# Patient Record
Sex: MALE | Race: WHITE | NOT HISPANIC OR LATINO | Employment: UNEMPLOYED | URBAN - METROPOLITAN AREA
[De-identification: names, ages, dates, MRNs, and addresses within clinical notes are randomized per-mention and may not be internally consistent; named-entity substitution may affect disease eponyms.]

---

## 2021-09-05 PROBLEM — M51.26 LUMBAR DISC HERNIATION: Status: ACTIVE | Noted: 2021-09-05

## 2021-09-05 PROBLEM — M48.061 LUMBAR FORAMINAL STENOSIS: Status: ACTIVE | Noted: 2021-09-05

## 2021-10-20 ENCOUNTER — HISTORICAL (OUTPATIENT)
Dept: ADMINISTRATIVE | Facility: HOSPITAL | Age: 41
End: 2021-10-20

## 2021-10-20 LAB
ABS NEUT (OLG): 3.22 X10(3)/MCL (ref 2.1–9.2)
ALBUMIN SERPL-MCNC: 4.5 GM/DL (ref 3.5–5)
ALBUMIN/GLOB SERPL: 1.6 RATIO (ref 1.1–2)
ALP SERPL-CCNC: 68 UNIT/L (ref 40–150)
ALT SERPL-CCNC: 11 UNIT/L (ref 0–55)
AMPHET UR QL SCN: NEGATIVE
APPEARANCE, UA: CLEAR
AST SERPL-CCNC: 17 UNIT/L (ref 5–34)
BACTERIA #/AREA URNS AUTO: ABNORMAL /HPF
BARBITURATE SCN PRESENT UR: NEGATIVE
BASOPHILS # BLD AUTO: 0 X10(3)/MCL (ref 0–0.2)
BASOPHILS NFR BLD AUTO: 0 %
BENZODIAZ UR QL SCN: NEGATIVE
BILIRUB SERPL-MCNC: 0.4 MG/DL
BILIRUB UR QL STRIP: NEGATIVE
BILIRUBIN DIRECT+TOT PNL SERPL-MCNC: 0.2 MG/DL (ref 0–0.5)
BILIRUBIN DIRECT+TOT PNL SERPL-MCNC: 0.2 MG/DL (ref 0–0.8)
BUN SERPL-MCNC: 15.1 MG/DL (ref 8.9–20.6)
CALCIUM SERPL-MCNC: 10 MG/DL (ref 8.4–10.2)
CANNABINOIDS UR QL SCN: NEGATIVE
CHLORIDE SERPL-SCNC: 105 MMOL/L (ref 98–107)
CHOLEST SERPL-MCNC: 205 MG/DL
CHOLEST/HDLC SERPL: 5 {RATIO} (ref 0–5)
CO2 SERPL-SCNC: 30 MMOL/L (ref 22–29)
COCAINE UR QL SCN: NEGATIVE
COLOR UR: YELLOW
CREAT SERPL-MCNC: 1.23 MG/DL (ref 0.73–1.18)
EOSINOPHIL # BLD AUTO: 0.1 X10(3)/MCL (ref 0–0.9)
EOSINOPHIL NFR BLD AUTO: 2 %
ERYTHROCYTE [DISTWIDTH] IN BLOOD BY AUTOMATED COUNT: 11.9 % (ref 11.5–14.5)
EST. AVERAGE GLUCOSE BLD GHB EST-MCNC: 99.7 MG/DL
GLOBULIN SER-MCNC: 2.9 GM/DL (ref 2.4–3.5)
GLUCOSE (UA): NEGATIVE
GLUCOSE SERPL-MCNC: 92 MG/DL (ref 74–100)
HAV IGM SERPL QL IA: NONREACTIVE
HBA1C MFR BLD: 5.1 %
HBV CORE IGM SERPL QL IA: NONREACTIVE
HBV SURFACE AG SERPL QL IA: NONREACTIVE
HCT VFR BLD AUTO: 42.9 % (ref 40–51)
HCV AB SERPL QL IA: NONREACTIVE
HDLC SERPL-MCNC: 39 MG/DL (ref 35–60)
HGB BLD-MCNC: 15 GM/DL (ref 13.5–17.5)
HGB UR QL STRIP: NEGATIVE
HIV 1+2 AB+HIV1 P24 AG SERPL QL IA: NONREACTIVE
HYALINE CASTS #/AREA URNS LPF: ABNORMAL /LPF
IMM GRANULOCYTES # BLD AUTO: 0.01 10*3/UL
IMM GRANULOCYTES NFR BLD AUTO: 0 %
KETONES UR QL STRIP: NEGATIVE
LDLC SERPL CALC-MCNC: 128 MG/DL (ref 50–140)
LEUKOCYTE ESTERASE UR QL STRIP: NEGATIVE
LYMPHOCYTES # BLD AUTO: 1.6 X10(3)/MCL (ref 0.6–4.6)
LYMPHOCYTES NFR BLD AUTO: 28 %
MCH RBC QN AUTO: 31.4 PG (ref 26–34)
MCHC RBC AUTO-ENTMCNC: 35 GM/DL (ref 31–37)
MCV RBC AUTO: 89.9 FL (ref 80–100)
MONOCYTES # BLD AUTO: 0.6 X10(3)/MCL (ref 0.1–1.3)
MONOCYTES NFR BLD AUTO: 10 %
NEUTROPHILS # BLD AUTO: 3.22 X10(3)/MCL (ref 2.1–9.2)
NEUTROPHILS NFR BLD AUTO: 58 %
NITRITE UR QL STRIP: NEGATIVE
NRBC BLD AUTO-RTO: 0 % (ref 0–0.2)
OPIATES UR QL SCN: NEGATIVE
PCP UR QL: NEGATIVE
PH UR STRIP.AUTO: 7 [PH] (ref 3–11)
PH UR STRIP: 7 [PH] (ref 4.5–8)
PLATELET # BLD AUTO: 220 X10(3)/MCL (ref 130–400)
PMV BLD AUTO: 9.8 FL (ref 7.4–10.4)
POTASSIUM SERPL-SCNC: 4 MMOL/L (ref 3.5–5.1)
PROT SERPL-MCNC: 7.4 GM/DL (ref 6.4–8.3)
PROT UR QL STRIP: 20 MG/DL
RBC # BLD AUTO: 4.77 X10(6)/MCL (ref 4.5–5.9)
RBC #/AREA URNS AUTO: ABNORMAL /HPF
SODIUM SERPL-SCNC: 143 MMOL/L (ref 136–145)
SP GR UR STRIP: 1.03 (ref 1–1.03)
SQUAMOUS #/AREA URNS LPF: ABNORMAL /LPF
T PALLIDUM AB SER QL: NONREACTIVE
TRIGL SERPL-MCNC: 191 MG/DL (ref 34–140)
UROBILINOGEN UR STRIP-ACNC: 4 MG/DL
VLDLC SERPL CALC-MCNC: 38 MG/DL
WBC # SPEC AUTO: 5.5 X10(3)/MCL (ref 4.5–11)
WBC #/AREA URNS AUTO: ABNORMAL /HPF

## 2021-10-28 PROBLEM — M54.16 LUMBAR RADICULOPATHY: Status: ACTIVE | Noted: 2021-10-28

## 2022-02-02 ENCOUNTER — HISTORICAL (OUTPATIENT)
Dept: OCCUPATIONAL THERAPY | Facility: HOSPITAL | Age: 42
End: 2022-02-02

## 2022-02-04 ENCOUNTER — HISTORICAL (OUTPATIENT)
Dept: OCCUPATIONAL THERAPY | Facility: HOSPITAL | Age: 42
End: 2022-02-04

## 2022-02-08 ENCOUNTER — HISTORICAL (OUTPATIENT)
Dept: OCCUPATIONAL THERAPY | Facility: HOSPITAL | Age: 42
End: 2022-02-08

## 2022-02-11 ENCOUNTER — HISTORICAL (OUTPATIENT)
Dept: OCCUPATIONAL THERAPY | Facility: HOSPITAL | Age: 42
End: 2022-02-11

## 2022-02-22 ENCOUNTER — HISTORICAL (OUTPATIENT)
Dept: OCCUPATIONAL THERAPY | Facility: HOSPITAL | Age: 42
End: 2022-02-22

## 2022-02-28 ENCOUNTER — HISTORICAL (OUTPATIENT)
Dept: OCCUPATIONAL THERAPY | Facility: HOSPITAL | Age: 42
End: 2022-02-28

## 2022-03-03 ENCOUNTER — HISTORICAL (OUTPATIENT)
Dept: OCCUPATIONAL THERAPY | Facility: HOSPITAL | Age: 42
End: 2022-03-03

## 2022-04-11 ENCOUNTER — HISTORICAL (OUTPATIENT)
Dept: ADMINISTRATIVE | Facility: HOSPITAL | Age: 42
End: 2022-04-11
Payer: MEDICAID

## 2022-04-28 VITALS
WEIGHT: 166.69 LBS | DIASTOLIC BLOOD PRESSURE: 80 MMHG | SYSTOLIC BLOOD PRESSURE: 130 MMHG | HEIGHT: 70 IN | BODY MASS INDEX: 23.86 KG/M2 | OXYGEN SATURATION: 96 %

## 2022-06-29 ENCOUNTER — HOSPITAL ENCOUNTER (OUTPATIENT)
Dept: RADIOLOGY | Facility: HOSPITAL | Age: 42
Discharge: HOME | End: 2022-06-29
Attending: NEUROLOGICAL SURGERY
Payer: MEDICAID

## 2022-06-29 DIAGNOSIS — M54.9 DORSALGIA, UNSPECIFIED: ICD-10-CM

## 2022-06-29 PROCEDURE — 72158 MRI LUMBAR SPINE W/O & W/DYE: CPT | Mod: TC

## 2022-06-29 PROCEDURE — A9577 INJ MULTIHANCE: HCPCS | Performed by: NEUROLOGICAL SURGERY

## 2022-06-29 PROCEDURE — 25500020 PHARM REV CODE 255: Performed by: NEUROLOGICAL SURGERY

## 2022-06-29 RX ADMIN — GADOBENATE DIMEGLUMINE 15 ML: 529 INJECTION, SOLUTION INTRAVENOUS at 12:06

## 2022-08-18 DIAGNOSIS — R39.198 DIFFICULTY URINATING: Primary | ICD-10-CM

## 2022-09-13 NOTE — PROGRESS NOTES
Chief Complaint: No chief complaint on file.      HPI:  Patient is a 42-year-old male referred to Urology for difficulty urinating.  Patient urine stream weak, persistent hesitancy, and straining, interruption of stream, dribbling, frequency, denies nocturia. WESLEY:  As below.  Denies Family history of urologic pathology & personal history of stones. Bladder Scan 30 ml patient voided 30 minutes prior.         Allergies:  Review of patient's allergies indicates:   Allergen Reactions    Iodine and iodide containing products        Medications:  Current Outpatient Medications   Medication Sig Dispense Refill    cyclobenzaprine HCl (CYCLOBENZAPRINE ORAL) Take by mouth.      gabapentin (NEURONTIN) 600 MG tablet Take 600 mg by mouth 3 (three) times daily.       No current facility-administered medications for this visit.       Review of Systems:  General: No fever, chills, fatigability, or weight loss.  Skin: No rashes, itching, or changes in color or texture of skin.  Chest: Denies PATEL, cyanosis, wheezing, cough, and sputum production.  Abdomen: Appetite fine. No weight loss. Denies diarrhea, abdominal pain, hematemesis, or blood in stool.  Musculoskeletal: No joint stiffness or swelling. Denies back pain.  : As above.  All other review of systems negative.    PMH:  No past medical history on file.    PSH:  Past Surgical History:   Procedure Laterality Date    EPIDURAL STEROID INJECTION Bilateral 10/7/2021    Procedure: Injection, Steroid, Epidural Interlaminar L4-L5;  Surgeon: Isamar Vazquez MD;  Location: [facility] MAIN OR;  Service: Anesthesiology;  Laterality: Bilateral;    EPIDURAL STEROID INJECTION INTO LUMBAR SPINE N/A 10/28/2021    Procedure: Injection-steroid-epidural-lumbar;  Surgeon: Isamar Vazquez MD;  Location: [facility] MAIN OR;  Service: Anesthesiology;  Laterality: N/A;    MINIMALLY INVASIVE SURGICAL REMOVAL OF INTERVERTEBRAL DISC OF SPINE USING MICROSCOPE Right 1/5/2022    Procedure: DISCECTOMY, SPINE, MINIMALLY  INVASIVE, USING MICROSCOPE; Right L4-5; 3hrs duration; prone; C-arm; Microscope; METRx tubes; Damian flat with Chris frame;;  Surgeon: Oseas Dumont MD;  Location: [facility] MAIN OR;  Service: Neurosurgery;  Laterality: Right;       FamHx:  Family History   Problem Relation Age of Onset    Arthritis Father     Asthma Father     COPD Father     Stroke Father        SocHx:  Social History     Socioeconomic History    Marital status: Significant Other   Tobacco Use    Smoking status: Every Day     Packs/day: 0.50     Types: Cigarettes    Smokeless tobacco: Never   Substance and Sexual Activity    Alcohol use: Never    Drug use: Never    Sexual activity: Not Currently       Physical Exam:  There were no vitals filed for this visit.  General: A&Ox3, no apparent distress, no deformities  Neck: No masses, normal thyroid  Lungs: CTA keenan, no use of accessory muscles  Heart: RRR, no arrhythmias  Abdomen: Soft, NT, ND, no masses, no hernias, no hepatosplenomegaly  Lymphatic: Neck and groin nodes negative  Skin: The skin is warm and dry. No jaundice.  Ext: No c/c/e.    GUMale: Test desc keenan, no abnormalities of epididymus. Penis, with normal penile and scrotal skin. Meatus normal. Normal rectal tone, no hemorrhoids. Prostate 2 and half fingerbreadths wide, smooth, soft, nontender, no nodules or masses appreciated. SV not palpable. Perineum and anus normal.    Labs:  None    Urinalysis:  None        Imaging:  None     Impression:  Difficulty urinating      Plan:  Get PSA now, start Flomax 0.4 mg p.o. daily, F/U 2 months. Instructed patient on Timed voiding every 2-3 hours, double voiding, avoiding bladder irritants, such as alcohol, citrus foods & drinks, caffeine drinks energy drinks, spicy foods, sodas, increase water intake.  Instructed patient to avoid drinking fluids 1 to hours prior to bedtime.

## 2022-09-14 ENCOUNTER — LAB VISIT (OUTPATIENT)
Dept: LAB | Facility: HOSPITAL | Age: 42
End: 2022-09-14
Attending: NURSE PRACTITIONER
Payer: MEDICAID

## 2022-09-14 ENCOUNTER — OFFICE VISIT (OUTPATIENT)
Dept: UROLOGY | Facility: CLINIC | Age: 42
End: 2022-09-14
Payer: MEDICAID

## 2022-09-14 VITALS
OXYGEN SATURATION: 97 % | DIASTOLIC BLOOD PRESSURE: 86 MMHG | SYSTOLIC BLOOD PRESSURE: 125 MMHG | TEMPERATURE: 99 F | BODY MASS INDEX: 22.94 KG/M2 | RESPIRATION RATE: 20 BRPM | HEART RATE: 88 BPM | WEIGHT: 160.25 LBS | HEIGHT: 70 IN

## 2022-09-14 DIAGNOSIS — N40.1 BENIGN PROSTATIC HYPERPLASIA (BPH) WITH STRAINING ON URINATION: Primary | ICD-10-CM

## 2022-09-14 DIAGNOSIS — R39.16 BENIGN PROSTATIC HYPERPLASIA (BPH) WITH STRAINING ON URINATION: ICD-10-CM

## 2022-09-14 DIAGNOSIS — N40.1 BENIGN PROSTATIC HYPERPLASIA (BPH) WITH STRAINING ON URINATION: ICD-10-CM

## 2022-09-14 DIAGNOSIS — R39.16 BENIGN PROSTATIC HYPERPLASIA (BPH) WITH STRAINING ON URINATION: Primary | ICD-10-CM

## 2022-09-14 DIAGNOSIS — R39.198 DIFFICULTY URINATING: ICD-10-CM

## 2022-09-14 LAB
FREE/TOTAL PSA (OHS): 26.1 %
PSA FREE MFR SERPL: 26 %
PSA FREE SERPL-MCNC: 0.24 NG/ML
PSA SERPL-MCNC: 0.92 NG/ML

## 2022-09-14 PROCEDURE — 3079F DIAST BP 80-89 MM HG: CPT | Mod: CPTII,,, | Performed by: NURSE PRACTITIONER

## 2022-09-14 PROCEDURE — 1160F PR REVIEW ALL MEDS BY PRESCRIBER/CLIN PHARMACIST DOCUMENTED: ICD-10-PCS | Mod: CPTII,,, | Performed by: NURSE PRACTITIONER

## 2022-09-14 PROCEDURE — 3074F PR MOST RECENT SYSTOLIC BLOOD PRESSURE < 130 MM HG: ICD-10-PCS | Mod: CPTII,,, | Performed by: NURSE PRACTITIONER

## 2022-09-14 PROCEDURE — 36415 COLL VENOUS BLD VENIPUNCTURE: CPT

## 2022-09-14 PROCEDURE — 84154 ASSAY OF PSA FREE: CPT

## 2022-09-14 PROCEDURE — 1160F RVW MEDS BY RX/DR IN RCRD: CPT | Mod: CPTII,,, | Performed by: NURSE PRACTITIONER

## 2022-09-14 PROCEDURE — 3079F PR MOST RECENT DIASTOLIC BLOOD PRESSURE 80-89 MM HG: ICD-10-PCS | Mod: CPTII,,, | Performed by: NURSE PRACTITIONER

## 2022-09-14 PROCEDURE — 84153 ASSAY OF PSA TOTAL: CPT

## 2022-09-14 PROCEDURE — 99204 OFFICE O/P NEW MOD 45 MIN: CPT | Mod: S$PBB,,, | Performed by: NURSE PRACTITIONER

## 2022-09-14 PROCEDURE — 1159F MED LIST DOCD IN RCRD: CPT | Mod: CPTII,,, | Performed by: NURSE PRACTITIONER

## 2022-09-14 PROCEDURE — 99214 OFFICE O/P EST MOD 30 MIN: CPT | Mod: PBBFAC | Performed by: NURSE PRACTITIONER

## 2022-09-14 PROCEDURE — 3008F BODY MASS INDEX DOCD: CPT | Mod: CPTII,,, | Performed by: NURSE PRACTITIONER

## 2022-09-14 PROCEDURE — 3008F PR BODY MASS INDEX (BMI) DOCUMENTED: ICD-10-PCS | Mod: CPTII,,, | Performed by: NURSE PRACTITIONER

## 2022-09-14 PROCEDURE — 3074F SYST BP LT 130 MM HG: CPT | Mod: CPTII,,, | Performed by: NURSE PRACTITIONER

## 2022-09-14 PROCEDURE — 51798 US URINE CAPACITY MEASURE: CPT | Mod: PBBFAC | Performed by: NURSE PRACTITIONER

## 2022-09-14 PROCEDURE — 99204 PR OFFICE/OUTPT VISIT, NEW, LEVL IV, 45-59 MIN: ICD-10-PCS | Mod: S$PBB,,, | Performed by: NURSE PRACTITIONER

## 2022-09-14 PROCEDURE — 1159F PR MEDICATION LIST DOCUMENTED IN MEDICAL RECORD: ICD-10-PCS | Mod: CPTII,,, | Performed by: NURSE PRACTITIONER

## 2022-09-14 RX ORDER — BUPRENORPHINE HYDROCHLORIDE AND NALOXONE HYDROCHLORIDE DIHYDRATE 2; .5 MG/1; MG/1
3 TABLET SUBLINGUAL EVERY 6 HOURS PRN
COMMUNITY
End: 2023-06-06 | Stop reason: ALTCHOICE

## 2022-09-14 RX ORDER — TAMSULOSIN HYDROCHLORIDE 0.4 MG/1
0.4 CAPSULE ORAL DAILY
Qty: 30 CAPSULE | Refills: 11 | Status: SHIPPED | OUTPATIENT
Start: 2022-09-14 | End: 2022-11-16

## 2022-09-14 NOTE — PROGRESS NOTES
Placed in room. Seen by Marco Blanchard NP.  Spoke with patient. Obtain U/A now. RTC in 2  months.

## 2022-09-15 ENCOUNTER — TELEPHONE (OUTPATIENT)
Dept: UROLOGY | Facility: CLINIC | Age: 42
End: 2022-09-15
Payer: MEDICAID

## 2022-09-15 NOTE — TELEPHONE ENCOUNTER
Spoke to patient via telephone regarding PSA level 0.98 instructed patient to follow discharge instruction as discussed will re-evaluate in 2 months.

## 2022-11-16 ENCOUNTER — OFFICE VISIT (OUTPATIENT)
Dept: UROLOGY | Facility: CLINIC | Age: 42
End: 2022-11-16
Payer: MEDICAID

## 2022-11-16 VITALS
RESPIRATION RATE: 18 BRPM | TEMPERATURE: 98 F | DIASTOLIC BLOOD PRESSURE: 84 MMHG | OXYGEN SATURATION: 97 % | HEIGHT: 71 IN | BODY MASS INDEX: 21.6 KG/M2 | SYSTOLIC BLOOD PRESSURE: 137 MMHG | HEART RATE: 72 BPM | WEIGHT: 154.31 LBS

## 2022-11-16 DIAGNOSIS — R39.12 BENIGN PROSTATIC HYPERPLASIA WITH WEAK URINARY STREAM: Primary | ICD-10-CM

## 2022-11-16 DIAGNOSIS — N40.1 BENIGN PROSTATIC HYPERPLASIA WITH WEAK URINARY STREAM: Primary | ICD-10-CM

## 2022-11-16 PROCEDURE — 99214 OFFICE O/P EST MOD 30 MIN: CPT | Mod: PBBFAC | Performed by: NURSE PRACTITIONER

## 2022-11-16 PROCEDURE — 99213 PR OFFICE/OUTPT VISIT, EST, LEVL III, 20-29 MIN: ICD-10-PCS | Mod: S$PBB,,, | Performed by: NURSE PRACTITIONER

## 2022-11-16 PROCEDURE — 3079F PR MOST RECENT DIASTOLIC BLOOD PRESSURE 80-89 MM HG: ICD-10-PCS | Mod: CPTII,,, | Performed by: NURSE PRACTITIONER

## 2022-11-16 PROCEDURE — 3075F SYST BP GE 130 - 139MM HG: CPT | Mod: CPTII,,, | Performed by: NURSE PRACTITIONER

## 2022-11-16 PROCEDURE — 3008F BODY MASS INDEX DOCD: CPT | Mod: CPTII,,, | Performed by: NURSE PRACTITIONER

## 2022-11-16 PROCEDURE — 3008F PR BODY MASS INDEX (BMI) DOCUMENTED: ICD-10-PCS | Mod: CPTII,,, | Performed by: NURSE PRACTITIONER

## 2022-11-16 PROCEDURE — 1159F MED LIST DOCD IN RCRD: CPT | Mod: CPTII,,, | Performed by: NURSE PRACTITIONER

## 2022-11-16 PROCEDURE — 3079F DIAST BP 80-89 MM HG: CPT | Mod: CPTII,,, | Performed by: NURSE PRACTITIONER

## 2022-11-16 PROCEDURE — 1160F PR REVIEW ALL MEDS BY PRESCRIBER/CLIN PHARMACIST DOCUMENTED: ICD-10-PCS | Mod: CPTII,,, | Performed by: NURSE PRACTITIONER

## 2022-11-16 PROCEDURE — 1159F PR MEDICATION LIST DOCUMENTED IN MEDICAL RECORD: ICD-10-PCS | Mod: CPTII,,, | Performed by: NURSE PRACTITIONER

## 2022-11-16 PROCEDURE — 3075F PR MOST RECENT SYSTOLIC BLOOD PRESS GE 130-139MM HG: ICD-10-PCS | Mod: CPTII,,, | Performed by: NURSE PRACTITIONER

## 2022-11-16 PROCEDURE — 99213 OFFICE O/P EST LOW 20 MIN: CPT | Mod: S$PBB,,, | Performed by: NURSE PRACTITIONER

## 2022-11-16 PROCEDURE — 1160F RVW MEDS BY RX/DR IN RCRD: CPT | Mod: CPTII,,, | Performed by: NURSE PRACTITIONER

## 2022-11-16 RX ORDER — TAMSULOSIN HYDROCHLORIDE 0.4 MG/1
0.4 CAPSULE ORAL DAILY
Qty: 90 CAPSULE | Refills: 3 | Status: SHIPPED | OUTPATIENT
Start: 2022-11-16 | End: 2022-11-17

## 2022-11-16 NOTE — PROGRESS NOTES
Patient seen by Marco Blanchard NP. Sending new prescription for increased strength of flomax to patient pharmacy. RTC 3 months. Discharge instructions given verbal and written.

## 2022-11-16 NOTE — PROGRESS NOTES
Chief Complaint:   Chief Complaint   Patient presents with    Benign Prostatic Hypertrophy       HPI: Patient is a 42-year-old male here for 2 month F/U for difficulty urinating.  Patient placed Flomax 0.4 mg p.o. daily along with behavior modification. Patient urine stream from the 1st then weekend after bottom month, as did do with  hesitancy, straining, interruption of stream, dribbling, patient denies frequency, nocturia.           Allergies:  Review of patient's allergies indicates:   Allergen Reactions    Iodine and iodide containing products        Medications:  Current Outpatient Medications   Medication Sig Dispense Refill    buprenorphine-naloxone 2-0.5 mg (SUBOXONE) 2-0.5 mg Subl Place 3 tablets under the tongue every 6 (six) hours as needed.      gabapentin (NEURONTIN) 600 MG tablet Take 600 mg by mouth 3 (three) times daily.      tamsulosin (FLOMAX) 0.4 mg Cap Take 1 capsule (0.4 mg total) by mouth once daily. 30 capsule 11    cyclobenzaprine HCl (CYCLOBENZAPRINE ORAL) Take by mouth.       No current facility-administered medications for this visit.       Review of Systems:  General: No fever, chills, fatigability, or weight loss.  Skin: No rashes, itching, or changes in color or texture of skin.  Chest: Denies PATEL, cyanosis, wheezing, cough, and sputum production.  Abdomen: Appetite fine. No weight loss. Denies diarrhea, abdominal pain, hematemesis, or blood in stool.  Musculoskeletal: No joint stiffness or swelling. Denies back pain.  : As above.  All other review of systems negative.    PMH:  No past medical history on file.    PSH:  Past Surgical History:   Procedure Laterality Date    EPIDURAL STEROID INJECTION Bilateral 10/7/2021    Procedure: Injection, Steroid, Epidural Interlaminar L4-L5;  Surgeon: Isamar Vazquez MD;  Location: [facility] MAIN OR;  Service: Anesthesiology;  Laterality: Bilateral;    EPIDURAL STEROID INJECTION INTO LUMBAR SPINE N/A 10/28/2021    Procedure:  Injection-steroid-epidural-lumbar;  Surgeon: sIamar Vazquez MD;  Location: [facility] MAIN OR;  Service: Anesthesiology;  Laterality: N/A;    MINIMALLY INVASIVE SURGICAL REMOVAL OF INTERVERTEBRAL DISC OF SPINE USING MICROSCOPE Right 1/5/2022    Procedure: DISCECTOMY, SPINE, MINIMALLY INVASIVE, USING MICROSCOPE; Right L4-5; 3hrs duration; prone; C-arm; Microscope; METRx tubes; Damian flat with Chris frame;;  Surgeon: Oseas Dumont MD;  Location: [facility] MAIN OR;  Service: Neurosurgery;  Laterality: Right;       FamHx:  Family History   Problem Relation Age of Onset    Arthritis Father     Asthma Father     COPD Father     Stroke Father        SocHx:  Social History     Socioeconomic History    Marital status: Significant Other   Tobacco Use    Smoking status: Every Day     Packs/day: 0.50     Types: Cigarettes    Smokeless tobacco: Current   Substance and Sexual Activity    Alcohol use: Never    Drug use: Never    Sexual activity: Not Currently       Physical Exam:  Vitals:    11/16/22 1107   BP: 137/84   Pulse: 72   Resp: 18   Temp: 98.2 °F (36.8 °C)     General: A&Ox3, no apparent distress, no deformities  Neck: No masses, normal thyroid  Lungs: CTA keenan, no use of accessory muscles  Heart: RRR, no arrhythmias  Abdomen: Soft, NT, ND, no masses, no hernias, no hepatosplenomegaly  Lymphatic: Neck and groin nodes negative  Skin: The skin is warm and dry. No jaundice.  Ext: No c/c/e.        Impression: BPH      Plan:  Increase Flomax 0.8 mg p.o. daily F/U3 months. Instructed patient on Timed voiding every 2-3 hours, double voiding, avoiding bladder irritants, such as alcohol, citrus foods & drinks, caffeine drinks energy drinks, spicy foods, sodas, increase water intake.

## 2022-11-17 DIAGNOSIS — N40.0 BENIGN PROSTATIC HYPERPLASIA WITHOUT LOWER URINARY TRACT SYMPTOMS: Primary | ICD-10-CM

## 2022-11-17 RX ORDER — TAMSULOSIN HYDROCHLORIDE 0.4 MG/1
0.4 CAPSULE ORAL DAILY
Qty: 90 CAPSULE | Refills: 3 | Status: SHIPPED | OUTPATIENT
Start: 2022-11-17 | End: 2022-11-29

## 2022-11-29 DIAGNOSIS — R39.16 BENIGN PROSTATIC HYPERPLASIA (BPH) WITH STRAINING ON URINATION: Primary | ICD-10-CM

## 2022-11-29 DIAGNOSIS — N40.1 BENIGN PROSTATIC HYPERPLASIA (BPH) WITH STRAINING ON URINATION: Primary | ICD-10-CM

## 2022-11-29 RX ORDER — TAMSULOSIN HYDROCHLORIDE 0.4 MG/1
0.8 CAPSULE ORAL DAILY
Qty: 180 CAPSULE | Refills: 3 | Status: SHIPPED | OUTPATIENT
Start: 2022-11-29 | End: 2023-02-16

## 2023-02-16 ENCOUNTER — OFFICE VISIT (OUTPATIENT)
Dept: UROLOGY | Facility: CLINIC | Age: 43
End: 2023-02-16
Payer: MEDICAID

## 2023-02-16 VITALS
TEMPERATURE: 98 F | RESPIRATION RATE: 20 BRPM | OXYGEN SATURATION: 100 % | WEIGHT: 148 LBS | BODY MASS INDEX: 21.19 KG/M2 | DIASTOLIC BLOOD PRESSURE: 88 MMHG | HEART RATE: 97 BPM | SYSTOLIC BLOOD PRESSURE: 128 MMHG | HEIGHT: 70 IN

## 2023-02-16 DIAGNOSIS — N40.1 BENIGN PROSTATIC HYPERPLASIA (BPH) WITH STRAINING ON URINATION: Primary | ICD-10-CM

## 2023-02-16 DIAGNOSIS — R39.16 BENIGN PROSTATIC HYPERPLASIA (BPH) WITH STRAINING ON URINATION: Primary | ICD-10-CM

## 2023-02-16 PROCEDURE — 3074F SYST BP LT 130 MM HG: CPT | Mod: CPTII,,, | Performed by: NURSE PRACTITIONER

## 2023-02-16 PROCEDURE — 99213 PR OFFICE/OUTPT VISIT, EST, LEVL III, 20-29 MIN: ICD-10-PCS | Mod: S$PBB,,, | Performed by: NURSE PRACTITIONER

## 2023-02-16 PROCEDURE — 3079F DIAST BP 80-89 MM HG: CPT | Mod: CPTII,,, | Performed by: NURSE PRACTITIONER

## 2023-02-16 PROCEDURE — 1160F PR REVIEW ALL MEDS BY PRESCRIBER/CLIN PHARMACIST DOCUMENTED: ICD-10-PCS | Mod: CPTII,,, | Performed by: NURSE PRACTITIONER

## 2023-02-16 PROCEDURE — 1159F MED LIST DOCD IN RCRD: CPT | Mod: CPTII,,, | Performed by: NURSE PRACTITIONER

## 2023-02-16 PROCEDURE — 3074F PR MOST RECENT SYSTOLIC BLOOD PRESSURE < 130 MM HG: ICD-10-PCS | Mod: CPTII,,, | Performed by: NURSE PRACTITIONER

## 2023-02-16 PROCEDURE — 3079F PR MOST RECENT DIASTOLIC BLOOD PRESSURE 80-89 MM HG: ICD-10-PCS | Mod: CPTII,,, | Performed by: NURSE PRACTITIONER

## 2023-02-16 PROCEDURE — 1159F PR MEDICATION LIST DOCUMENTED IN MEDICAL RECORD: ICD-10-PCS | Mod: CPTII,,, | Performed by: NURSE PRACTITIONER

## 2023-02-16 PROCEDURE — 99213 OFFICE O/P EST LOW 20 MIN: CPT | Mod: PBBFAC | Performed by: NURSE PRACTITIONER

## 2023-02-16 PROCEDURE — 99213 OFFICE O/P EST LOW 20 MIN: CPT | Mod: S$PBB,,, | Performed by: NURSE PRACTITIONER

## 2023-02-16 PROCEDURE — 1160F RVW MEDS BY RX/DR IN RCRD: CPT | Mod: CPTII,,, | Performed by: NURSE PRACTITIONER

## 2023-02-16 PROCEDURE — 3008F PR BODY MASS INDEX (BMI) DOCUMENTED: ICD-10-PCS | Mod: CPTII,,, | Performed by: NURSE PRACTITIONER

## 2023-02-16 PROCEDURE — 3008F BODY MASS INDEX DOCD: CPT | Mod: CPTII,,, | Performed by: NURSE PRACTITIONER

## 2023-02-16 RX ORDER — TAMSULOSIN HYDROCHLORIDE 0.4 MG/1
0.8 CAPSULE ORAL DAILY
Qty: 180 CAPSULE | Refills: 3 | Status: SHIPPED | OUTPATIENT
Start: 2023-02-16 | End: 2024-02-16

## 2023-02-16 NOTE — PROGRESS NOTES
Placed in room. Seen by Marco Blanchard NP. Spoke with patient. Bladder scan at 14ml. RTC in 6 months with a PSA.

## 2023-02-16 NOTE — PROGRESS NOTES
Chief Complaint:   Chief Complaint   Patient presents with    BPH and weak stream     States symptoms have improved       HPI:  Patient is a 42-year-old male here for 2 month follow-up for BPH.  Patient recently increased Flomax 0.8 mg p.o. daily for BPH.  Patient states improved urinary retention symptoms patient voiding very well now that Flomax has been increased.  Patient urine stream, hesitancy, straining, interruption of stream, dribbling, frequency, nocturia. WESLEY:  Deferred per patient.     Allergies:  Review of patient's allergies indicates:   Allergen Reactions    Iodine and iodide containing products        Medications:  Current Outpatient Medications   Medication Sig Dispense Refill    buprenorphine-naloxone 2-0.5 mg (SUBOXONE) 2-0.5 mg Subl Place 3 tablets under the tongue every 6 (six) hours as needed.      gabapentin (NEURONTIN) 600 MG tablet Take 600 mg by mouth 3 (three) times daily.      tamsulosin (FLOMAX) 0.4 mg Cap Take 2 capsules (0.8 mg total) by mouth once daily. 180 capsule 3    cyclobenzaprine HCl (CYCLOBENZAPRINE ORAL) Take by mouth.       No current facility-administered medications for this visit.       Review of Systems:  General: No fever, chills, fatigability, or weight loss.  Skin: No rashes, itching, or changes in color or texture of skin.  Chest: Denies PATEL, cyanosis, wheezing, cough, and sputum production.  Abdomen: Appetite fine. No weight loss. Denies diarrhea, abdominal pain, hematemesis, or blood in stool.  Musculoskeletal: No joint stiffness or swelling. Denies back pain.  : As above.  All other review of systems negative.    PMH:  No past medical history on file.    PSH:  Past Surgical History:   Procedure Laterality Date    EPIDURAL STEROID INJECTION Bilateral 10/7/2021    Procedure: Injection, Steroid, Epidural Interlaminar L4-L5;  Surgeon: Isamar Vazquez MD;  Location: [facility] MAIN OR;  Service: Anesthesiology;  Laterality: Bilateral;    EPIDURAL STEROID INJECTION INTO  LUMBAR SPINE N/A 10/28/2021    Procedure: Injection-steroid-epidural-lumbar;  Surgeon: Isamar Vazquez MD;  Location: [facility] MAIN OR;  Service: Anesthesiology;  Laterality: N/A;    MINIMALLY INVASIVE SURGICAL REMOVAL OF INTERVERTEBRAL DISC OF SPINE USING MICROSCOPE Right 1/5/2022    Procedure: DISCECTOMY, SPINE, MINIMALLY INVASIVE, USING MICROSCOPE; Right L4-5; 3hrs duration; prone; C-arm; Microscope; METRx tubes; Damian flat with Chris frame;;  Surgeon: Oseas Dumont MD;  Location: [facility] MAIN OR;  Service: Neurosurgery;  Laterality: Right;       FamHx:  Family History   Problem Relation Age of Onset    Arthritis Father     Asthma Father     COPD Father     Stroke Father        SocHx:  Social History     Socioeconomic History    Marital status: Significant Other   Tobacco Use    Smoking status: Every Day     Packs/day: 0.50     Types: Cigarettes    Smokeless tobacco: Current   Substance and Sexual Activity    Alcohol use: Never    Drug use: Never    Sexual activity: Not Currently       Physical Exam:  Vitals:    02/16/23 1115   BP: 128/88   Pulse: 97   Resp: 20   Temp: 97.5 °F (36.4 °C)     General: A&Ox3, no apparent distress, no deformities  Neck: No masses, normal thyroid  Lungs: CTA keenan, no use of accessory muscles  Heart: RRR, no arrhythmias  Abdomen: Soft, NT, ND, no masses, no hernias, no hepatosplenomegaly  Lymphatic: Neck and groin nodes negative  Skin: The skin is warm and dry. No jaundice.  Ext: No c/c/e.              Impression:  BPH, urinary retention      Plan:  Continue Flomax 0.8 mg p.o. daily, F/U 6 months with PSA. Instructed patient on Timed voiding every 2-3 hours, double voiding, avoiding bladder irritants, such as alcohol, citrus foods & drinks, caffeine drinks energy drinks, spicy foods, sodas, increase water intake.  Instructed patient to avoid drinking fluids 2 hours prior to bedtime and throughout the night and P immediately prior to bedtime.

## 2023-06-06 ENCOUNTER — HOSPITAL ENCOUNTER (EMERGENCY)
Facility: HOSPITAL | Age: 43
Discharge: HOME | End: 2023-06-06
Attending: FAMILY MEDICINE
Payer: MEDICAID

## 2023-06-06 VITALS
TEMPERATURE: 98 F | RESPIRATION RATE: 20 BRPM | DIASTOLIC BLOOD PRESSURE: 92 MMHG | BODY MASS INDEX: 21.47 KG/M2 | SYSTOLIC BLOOD PRESSURE: 129 MMHG | OXYGEN SATURATION: 99 % | HEART RATE: 90 BPM | WEIGHT: 150 LBS | HEIGHT: 70 IN

## 2023-06-06 DIAGNOSIS — M25.552 LEFT HIP PAIN: ICD-10-CM

## 2023-06-06 PROCEDURE — 63600175 PHARM REV CODE 636 W HCPCS: Performed by: FAMILY MEDICINE

## 2023-06-06 PROCEDURE — 99284 EMERGENCY DEPT VISIT MOD MDM: CPT

## 2023-06-06 PROCEDURE — 96372 THER/PROPH/DIAG INJ SC/IM: CPT | Performed by: FAMILY MEDICINE

## 2023-06-06 RX ORDER — DEXAMETHASONE SODIUM PHOSPHATE 4 MG/ML
8 INJECTION, SOLUTION INTRA-ARTICULAR; INTRALESIONAL; INTRAMUSCULAR; INTRAVENOUS; SOFT TISSUE
Status: COMPLETED | OUTPATIENT
Start: 2023-06-06 | End: 2023-06-06

## 2023-06-06 RX ORDER — GABAPENTIN 800 MG/1
800 TABLET ORAL
COMMUNITY
Start: 2023-01-10

## 2023-06-06 RX ORDER — OXYCODONE AND ACETAMINOPHEN 10; 325 MG/1; MG/1
1 TABLET ORAL EVERY 6 HOURS PRN
COMMUNITY
Start: 2023-05-30

## 2023-06-06 RX ORDER — OXYCODONE HYDROCHLORIDE 15 MG/1
15 TABLET ORAL EVERY 6 HOURS PRN
COMMUNITY
Start: 2023-05-19

## 2023-06-06 RX ORDER — PREGABALIN 150 MG/1
150 CAPSULE ORAL 2 TIMES DAILY
COMMUNITY
Start: 2023-05-13

## 2023-06-06 RX ORDER — CYCLOBENZAPRINE HCL 10 MG
10 TABLET ORAL NIGHTLY
COMMUNITY
Start: 2023-05-30

## 2023-06-06 RX ORDER — KETOROLAC TROMETHAMINE 10 MG/1
10 TABLET, FILM COATED ORAL EVERY 6 HOURS
Qty: 20 TABLET | Refills: 0 | Status: SHIPPED | OUTPATIENT
Start: 2023-06-06 | End: 2023-06-11

## 2023-06-06 RX ADMIN — DEXAMETHASONE SODIUM PHOSPHATE 8 MG: 4 INJECTION, SOLUTION INTRA-ARTICULAR; INTRALESIONAL; INTRAMUSCULAR; INTRAVENOUS; SOFT TISSUE at 12:06

## 2023-06-06 NOTE — ED NOTES
Pt ambulated to room 2 with steady gait. Pt states he had back surgery may 11th and has had hip pain since then. Pt rates pain 10/10. Pt states he is on oxycodone 10mg. Pt denies needs at this time.

## 2023-06-06 NOTE — ED PROVIDER NOTES
Encounter Date: 6/6/2023       History     Chief Complaint   Patient presents with    Hip Pain     Left hip pain started years ago but has increased after back surgery on may 11th     This patient is a 42-year-old male who comes in with complaint of his left hip.  He states that he had recent lower back surgery and he feels that he really needed hip surgery.  He states that he is on oxycodone in his not helping him for the pain.    The history is provided by the patient.   Review of patient's allergies indicates:   Allergen Reactions    Iodine and iodide containing products      History reviewed. No pertinent past medical history.  Past Surgical History:   Procedure Laterality Date    EPIDURAL STEROID INJECTION Bilateral 10/7/2021    Procedure: Injection, Steroid, Epidural Interlaminar L4-L5;  Surgeon: Isamar Vazquez MD;  Location: [facility] MAIN OR;  Service: Anesthesiology;  Laterality: Bilateral;    EPIDURAL STEROID INJECTION INTO LUMBAR SPINE N/A 10/28/2021    Procedure: Injection-steroid-epidural-lumbar;  Surgeon: Isamar Vazquez MD;  Location: [facility] MAIN OR;  Service: Anesthesiology;  Laterality: N/A;    MINIMALLY INVASIVE SURGICAL REMOVAL OF INTERVERTEBRAL DISC OF SPINE USING MICROSCOPE Right 1/5/2022    Procedure: DISCECTOMY, SPINE, MINIMALLY INVASIVE, USING MICROSCOPE; Right L4-5; 3hrs duration; prone; C-arm; Microscope; METRx tubes; Damian flat with Chris frame;;  Surgeon: Oseas Dumont MD;  Location: [facility] MAIN OR;  Service: Neurosurgery;  Laterality: Right;     Family History   Problem Relation Age of Onset    Arthritis Father     Asthma Father     COPD Father     Stroke Father      Social History     Tobacco Use    Smoking status: Every Day     Packs/day: 0.50     Types: Cigarettes    Smokeless tobacco: Current   Substance Use Topics    Alcohol use: Never    Drug use: Never     Review of Systems   Constitutional: Negative.    HENT: Negative.     Respiratory: Negative.     Cardiovascular: Negative.     Endocrine: Negative.    Musculoskeletal: Negative.         Left hip pain   Skin: Negative.    Neurological: Negative.    Psychiatric/Behavioral: Negative.     All other systems reviewed and are negative.    Physical Exam     Initial Vitals [06/06/23 1145]   BP Pulse Resp Temp SpO2   (!) 130/102 92 18 97.9 °F (36.6 °C) (!) 94 %      MAP       --         Physical Exam    Nursing note and vitals reviewed.  Constitutional: He appears well-developed and well-nourished.   HENT:   Head: Normocephalic.   Eyes: Pupils are equal, round, and reactive to light.   Neck:   Normal range of motion.  Cardiovascular:  Normal rate and regular rhythm.           Pulmonary/Chest: Breath sounds normal.   Abdominal: Abdomen is soft. Bowel sounds are normal.   Musculoskeletal:         General: Normal range of motion.      Cervical back: Normal range of motion.        Legs:       Comments: Pain in left hip radiating down to the left leg     Neurological: He is alert and oriented to person, place, and time.   Skin: Skin is warm and dry.   Psychiatric: He has a normal mood and affect.       ED Course   Procedures  Labs Reviewed - No data to display       Imaging Results              X-Ray Hip 2 or 3 views Left (with Pelvis when performed) (Final result)  Result time 06/06/23 12:15:19      Final result by Luis Lagunas MD (06/06/23 12:15:19)                   Narrative:    EXAMINATION  XR HIP WITH PELVIS WHEN PERFORMED, 2 OR 3 VIEWS LEFT    CLINICAL HISTORY  Pain in left hip    TECHNIQUE  A total of 3 images submitted of the left hip/pelvis.    COMPARISON  None available at the time of initial interpretation.    FINDINGS  There incidentally noted postoperative changes of bilateral posterior fusion involving L4 through S1. No displaced fracture or dislocation is identified. The visualized pelvic ring structures and articular spaces are preserved. There is no suggestion of large joint effusion. No aggressive osseous lesion or periosteal  reaction is appreciated. The trabecular pattern is unremarkable.    The included soft tissues are without acute abnormality.    IMPRESSION  No convincing radiographic abnormality.      Electronically signed by: Luis Bebo  Date:    06/06/2023  Time:    12:15                                     Medications   dexAMETHasone injection 8 mg (8 mg Intramuscular Given 6/6/23 1209)     Medical Decision Making:   Initial Assessment:   This patient is a 42-year-old male who is currently taking oxycodone for pain in his left hip.  He really had recent back surgery but states that that did not relieve the pain in his left hip.  He is scheduled to see an orthopedist next week  Differential Diagnosis:   Left hip pain  Clinical Tests:   Radiological Study: Ordered and Reviewed  ED Management:  X-ray was done on this patient's left hip and it showed no acute pathology.  Patient was directed to have an MRI of his left hip, once he sees the orthopedist.  Since he states that he is on narcotic medicine not getting any relief I gave him a Decadron shot.  I wrote him for some Toradol for home                        Clinical Impression:   Final diagnoses:  [M25.552] Left hip pain        ED Disposition Condition    Discharge Stable          ED Prescriptions       Medication Sig Dispense Start Date End Date Auth. Provider    ketorolac (TORADOL) 10 mg tablet Take 1 tablet (10 mg total) by mouth every 6 (six) hours. for 5 days 20 tablet 6/6/2023 6/11/2023 Kaylin Najera MD          Follow-up Information       Follow up With Specialties Details Why Contact Info    Eulalio Puckett NP Family Medicine Go in 2 days  [address] [place] [address]  [phone]               Kaylin Najera MD  06/06/23 0321

## 2023-06-16 ENCOUNTER — HOSPITAL ENCOUNTER (EMERGENCY)
Facility: HOSPITAL | Age: 43
Discharge: HOME | End: 2023-06-16
Attending: FAMILY MEDICINE
Payer: MEDICAID

## 2023-06-16 VITALS
BODY MASS INDEX: 19.6 KG/M2 | SYSTOLIC BLOOD PRESSURE: 111 MMHG | TEMPERATURE: 98 F | HEIGHT: 71 IN | HEART RATE: 92 BPM | RESPIRATION RATE: 20 BRPM | DIASTOLIC BLOOD PRESSURE: 69 MMHG | OXYGEN SATURATION: 100 % | WEIGHT: 140 LBS

## 2023-06-16 DIAGNOSIS — K52.9 GASTROENTERITIS: Primary | ICD-10-CM

## 2023-06-16 LAB
ALBUMIN SERPL-MCNC: 3.9 G/DL (ref 3.5–5)
ALBUMIN/GLOB SERPL: 1.1 RATIO (ref 1.1–2)
ALP SERPL-CCNC: 140 UNIT/L (ref 40–150)
ALT SERPL-CCNC: 13 UNIT/L (ref 0–55)
AST SERPL-CCNC: 16 UNIT/L (ref 5–34)
BASOPHILS # BLD AUTO: 0.03 X10(3)/MCL
BASOPHILS NFR BLD AUTO: 0.4 %
BILIRUBIN DIRECT+TOT PNL SERPL-MCNC: 0.4 MG/DL
BUN SERPL-MCNC: 13 MG/DL (ref 8.9–20.6)
CALCIUM SERPL-MCNC: 10.1 MG/DL (ref 8.4–10.2)
CHLORIDE SERPL-SCNC: 107 MMOL/L (ref 98–107)
CO2 SERPL-SCNC: 25 MMOL/L (ref 22–29)
CREAT SERPL-MCNC: 0.9 MG/DL (ref 0.73–1.18)
EOSINOPHIL # BLD AUTO: 0.04 X10(3)/MCL (ref 0–0.9)
EOSINOPHIL NFR BLD AUTO: 0.5 %
ERYTHROCYTE [DISTWIDTH] IN BLOOD BY AUTOMATED COUNT: 11.9 % (ref 11.5–17)
GFR SERPLBLD CREATININE-BSD FMLA CKD-EPI: >60 MLS/MIN/1.73/M2
GLOBULIN SER-MCNC: 3.7 GM/DL (ref 2.4–3.5)
GLUCOSE SERPL-MCNC: 106 MG/DL (ref 74–100)
HCT VFR BLD AUTO: 38.7 % (ref 42–52)
HGB BLD-MCNC: 13.1 G/DL (ref 14–18)
IMM GRANULOCYTES # BLD AUTO: 0.03 X10(3)/MCL (ref 0–0.04)
IMM GRANULOCYTES NFR BLD AUTO: 0.4 %
LYMPHOCYTES # BLD AUTO: 1.53 X10(3)/MCL (ref 0.6–4.6)
LYMPHOCYTES NFR BLD AUTO: 18.1 %
MCH RBC QN AUTO: 30 PG (ref 27–31)
MCHC RBC AUTO-ENTMCNC: 33.9 G/DL (ref 33–36)
MCV RBC AUTO: 88.8 FL (ref 80–94)
MONOCYTES # BLD AUTO: 0.7 X10(3)/MCL (ref 0.1–1.3)
MONOCYTES NFR BLD AUTO: 8.3 %
NEUTROPHILS # BLD AUTO: 6.1 X10(3)/MCL (ref 2.1–9.2)
NEUTROPHILS NFR BLD AUTO: 72.3 %
NRBC BLD AUTO-RTO: 0 %
PLATELET # BLD AUTO: 297 X10(3)/MCL (ref 130–400)
PMV BLD AUTO: 9.6 FL (ref 7.4–10.4)
POTASSIUM SERPL-SCNC: 4.1 MMOL/L (ref 3.5–5.1)
PROT SERPL-MCNC: 7.6 GM/DL (ref 6.4–8.3)
RBC # BLD AUTO: 4.36 X10(6)/MCL (ref 4.7–6.1)
SODIUM SERPL-SCNC: 142 MMOL/L (ref 136–145)
WBC # SPEC AUTO: 8.43 X10(3)/MCL (ref 4.5–11.5)

## 2023-06-16 PROCEDURE — 96372 THER/PROPH/DIAG INJ SC/IM: CPT | Performed by: FAMILY MEDICINE

## 2023-06-16 PROCEDURE — 96360 HYDRATION IV INFUSION INIT: CPT

## 2023-06-16 PROCEDURE — 63600175 PHARM REV CODE 636 W HCPCS: Performed by: FAMILY MEDICINE

## 2023-06-16 PROCEDURE — 80053 COMPREHEN METABOLIC PANEL: CPT | Performed by: FAMILY MEDICINE

## 2023-06-16 PROCEDURE — 99284 EMERGENCY DEPT VISIT MOD MDM: CPT | Mod: 25

## 2023-06-16 PROCEDURE — 85025 COMPLETE CBC W/AUTO DIFF WBC: CPT | Performed by: FAMILY MEDICINE

## 2023-06-16 PROCEDURE — 25000003 PHARM REV CODE 250: Performed by: FAMILY MEDICINE

## 2023-06-16 RX ORDER — DICYCLOMINE HYDROCHLORIDE 10 MG/ML
20 INJECTION INTRAMUSCULAR
Status: COMPLETED | OUTPATIENT
Start: 2023-06-16 | End: 2023-06-16

## 2023-06-16 RX ORDER — DICYCLOMINE HYDROCHLORIDE 10 MG/1
10 CAPSULE ORAL 2 TIMES DAILY PRN
Qty: 12 CAPSULE | Refills: 0 | Status: SHIPPED | OUTPATIENT
Start: 2023-06-16

## 2023-06-16 RX ADMIN — DICYCLOMINE HYDROCHLORIDE 20 MG: 20 INJECTION, SOLUTION INTRAMUSCULAR at 09:06

## 2023-06-16 RX ADMIN — SODIUM CHLORIDE 1000 ML: 9 INJECTION, SOLUTION INTRAVENOUS at 09:06

## 2023-06-16 NOTE — ED NOTES
Reported by patient that he has been having leg cramps at night and for last couple of days been having diarrhea with some nausea. Has vomited yesterday morning and this morning but currently not nauseated.  has history of back surgery and has nerve damage that they believe to be related to degenerative disk that causes legs to hurt and spasm at times.  has not been able to hold much down the last couple of days and believes he might be dehydrated.

## 2023-06-16 NOTE — ED PROVIDER NOTES
Encounter Date: 6/16/2023       History     Chief Complaint   Patient presents with    Leg Cramps     Pt to Ed with c/o Leg cramps, diarrhea x few days (PT FEELS DEHYDRATED)      Patient is a 42-year-old male who comes in with diarrhea for the past few days and now complaining of leg cramps.  States that he feels dehydrated and that his leg cramps get much worse at night.    The history is provided by the patient.   Review of patient's allergies indicates:   Allergen Reactions    Iodine and iodide containing products      History reviewed. No pertinent past medical history.  Past Surgical History:   Procedure Laterality Date    EPIDURAL STEROID INJECTION Bilateral 10/7/2021    Procedure: Injection, Steroid, Epidural Interlaminar L4-L5;  Surgeon: Isamar Vazquez MD;  Location: [facility] MAIN OR;  Service: Anesthesiology;  Laterality: Bilateral;    EPIDURAL STEROID INJECTION INTO LUMBAR SPINE N/A 10/28/2021    Procedure: Injection-steroid-epidural-lumbar;  Surgeon: Isamar Vazquez MD;  Location: [facility] MAIN OR;  Service: Anesthesiology;  Laterality: N/A;    MINIMALLY INVASIVE SURGICAL REMOVAL OF INTERVERTEBRAL DISC OF SPINE USING MICROSCOPE Right 1/5/2022    Procedure: DISCECTOMY, SPINE, MINIMALLY INVASIVE, USING MICROSCOPE; Right L4-5; 3hrs duration; prone; C-arm; Microscope; METRx tubes; Damian flat with Chris frame;;  Surgeon: Oseas Dumont MD;  Location: [facility] MAIN OR;  Service: Neurosurgery;  Laterality: Right;     Family History   Problem Relation Age of Onset    Arthritis Father     Asthma Father     COPD Father     Stroke Father      Social History     Tobacco Use    Smoking status: Every Day     Packs/day: 0.50     Types: Cigarettes    Smokeless tobacco: Current   Substance Use Topics    Alcohol use: Never    Drug use: Yes     Types: Marijuana     Review of Systems   Constitutional: Negative.    HENT: Negative.     Respiratory: Negative.     Cardiovascular: Negative.    Gastrointestinal:  Positive for diarrhea  and nausea.   Endocrine: Negative.    Genitourinary: Negative.    Musculoskeletal: Negative.    Skin: Negative.    Neurological: Negative.    Psychiatric/Behavioral: Negative.     All other systems reviewed and are negative.    Physical Exam     Initial Vitals [06/16/23 0927]   BP Pulse Resp Temp SpO2   (!) 140/85 84 20 98 °F (36.7 °C) 96 %      MAP       --         Physical Exam    Nursing note and vitals reviewed.  Constitutional: He appears well-developed and well-nourished.   HENT:   Head: Normocephalic.   Eyes: Pupils are equal, round, and reactive to light.   Neck:   Normal range of motion.  Cardiovascular:  Normal rate and regular rhythm.           Pulmonary/Chest: Breath sounds normal.   Abdominal: Abdomen is soft and flat. Bowel sounds are decreased.   Musculoskeletal:         General: Normal range of motion.      Cervical back: Normal range of motion.     Neurological: He is alert and oriented to person, place, and time.   Skin: Skin is warm and dry. Capillary refill takes less than 2 seconds.   Psychiatric: He has a normal mood and affect.       ED Course   Procedures  Labs Reviewed   COMPREHENSIVE METABOLIC PANEL - Abnormal; Notable for the following components:       Result Value    Glucose Level 106 (*)     Globulin 3.7 (*)     All other components within normal limits   CBC WITH DIFFERENTIAL - Abnormal; Notable for the following components:    RBC 4.36 (*)     Hgb 13.1 (*)     Hct 38.7 (*)     All other components within normal limits   CBC W/ AUTO DIFFERENTIAL    Narrative:     The following orders were created for panel order CBC auto differential.  Procedure                               Abnormality         Status                     ---------                               -----------         ------                     CBC with Differential[[id number]]        Abnormal            Final result                 Please view results for these tests on the individual orders.          Imaging Results     None          Medications   sodium chloride 0.9% bolus 1,000 mL 1,000 mL ( Intravenous Verify Only 6/16/23 1013)   dicyclomine injection 20 mg (20 mg Intramuscular Given 6/16/23 6464)     Medical Decision Making:   Initial Assessment:   It was this patient is a 42-year-old male who comes in with diarrhea and dehydration.  Patient states that he has been having muscle cramps at night mostly and concerned he is dehydrated.  Differential Diagnosis:   Viral gastroenteritis, diarrhea, dehydrated  Clinical Tests:   Lab Tests: Ordered and Reviewed  ED Management:  Lab work was done on this patient is found to have a glucose of 106 but the rest of his CMP is normal his WBC count is 8.43 his hemoglobin is 13.1 with a hematocrit of 38.7.  Patient is given a L of fluids in the ER he feels much better.  I also gave him 20 mg of Bentyl since he had an episode of diarrhea prior to entering the emergency room.                        Clinical Impression:   Final diagnoses:  [K52.9] Gastroenteritis (Primary)        ED Disposition Condition    Discharge Stable          ED Prescriptions       Medication Sig Dispense Start Date End Date Auth. Provider    dicyclomine (BENTYL) 10 MG capsule Take 1 capsule (10 mg total) by mouth 2 (two) times daily as needed. 12 capsule 6/16/2023 -- Kaylin Najera MD          Follow-up Information       Follow up With Specialties Details Why Contact Nohemi Puckett NP Family Medicine Schedule an appointment as soon as possible for a visit in 2 days  [address] [place] [address] [address]ON [id number]  [phone]               Kaylin Najera MD  06/16/23 7419

## 2023-06-17 ENCOUNTER — HOSPITAL ENCOUNTER (EMERGENCY)
Facility: HOSPITAL | Age: 43
Discharge: HOME | End: 2023-06-17
Attending: STUDENT IN AN ORGANIZED HEALTH CARE EDUCATION/TRAINING PROGRAM
Payer: MEDICAID

## 2023-06-17 VITALS
SYSTOLIC BLOOD PRESSURE: 134 MMHG | HEIGHT: 70 IN | DIASTOLIC BLOOD PRESSURE: 92 MMHG | BODY MASS INDEX: 20.04 KG/M2 | RESPIRATION RATE: 18 BRPM | WEIGHT: 140 LBS | TEMPERATURE: 98 F | OXYGEN SATURATION: 100 % | HEART RATE: 96 BPM

## 2023-06-17 DIAGNOSIS — F11.93 OPIATE WITHDRAWAL: Primary | ICD-10-CM

## 2023-06-17 PROCEDURE — 99284 EMERGENCY DEPT VISIT MOD MDM: CPT

## 2023-06-17 PROCEDURE — 63600175 PHARM REV CODE 636 W HCPCS: Performed by: STUDENT IN AN ORGANIZED HEALTH CARE EDUCATION/TRAINING PROGRAM

## 2023-06-17 PROCEDURE — 96372 THER/PROPH/DIAG INJ SC/IM: CPT | Mod: 59 | Performed by: STUDENT IN AN ORGANIZED HEALTH CARE EDUCATION/TRAINING PROGRAM

## 2023-06-17 PROCEDURE — 96360 HYDRATION IV INFUSION INIT: CPT

## 2023-06-17 PROCEDURE — 25000003 PHARM REV CODE 250: Performed by: STUDENT IN AN ORGANIZED HEALTH CARE EDUCATION/TRAINING PROGRAM

## 2023-06-17 RX ORDER — DICYCLOMINE HYDROCHLORIDE 10 MG/ML
20 INJECTION INTRAMUSCULAR
Status: COMPLETED | OUTPATIENT
Start: 2023-06-17 | End: 2023-06-17

## 2023-06-17 RX ORDER — SODIUM CHLORIDE 9 MG/ML
1000 INJECTION, SOLUTION INTRAVENOUS
Status: COMPLETED | OUTPATIENT
Start: 2023-06-17 | End: 2023-06-17

## 2023-06-17 RX ADMIN — DICYCLOMINE HYDROCHLORIDE 20 MG: 20 INJECTION, SOLUTION INTRAMUSCULAR at 01:06

## 2023-06-17 RX ADMIN — SODIUM CHLORIDE 1000 ML: 9 INJECTION, SOLUTION INTRAVENOUS at 01:06

## 2023-06-17 NOTE — ED PROVIDER NOTES
Encounter Date: 6/17/2023       History     Chief Complaint   Patient presents with    Leg Pain     C/o restless legs and cramping in bilateral calves intermittently x 3 days. Pt states he was on oxycontin dt back sx last month and stopped meds 3 days ago, symptoms started after.      Patient is a 42-year-old white gentleman no significant past medical history presented to the ER today due to leg tremors, muscle cramps for the last 2 days.  Patient was seen here less than 1 day ago and had lab work which was reassuring.  Patient states that he responded well to fluids and Bentyl.  Important to note patient is a chronic opiate user and states that he stopped taking opiates 3 days ago.  Patient states he has tried to come off of opiates in the past and states that these of the same symptoms that he develops proximally 3 days after cessation.  Patient states he often results to using opiates again in an effort to stop the symptoms.  Patient states the symptoms are classic for his withdrawal from opiates.  Patient denies any other complaints including fever, chills, cough, congestion chest pain, shortness of breath abdominal pain.  Patient denies any hematuria.    Review of patient's allergies indicates:   Allergen Reactions    Iodine and iodide containing products      History reviewed. No pertinent past medical history.  Past Surgical History:   Procedure Laterality Date    EPIDURAL STEROID INJECTION Bilateral 10/7/2021    Procedure: Injection, Steroid, Epidural Interlaminar L4-L5;  Surgeon: Isamar Vazquez MD;  Location: [facility] MAIN OR;  Service: Anesthesiology;  Laterality: Bilateral;    EPIDURAL STEROID INJECTION INTO LUMBAR SPINE N/A 10/28/2021    Procedure: Injection-steroid-epidural-lumbar;  Surgeon: Isamar Vazquez MD;  Location: [facility] MAIN OR;  Service: Anesthesiology;  Laterality: N/A;    MINIMALLY INVASIVE SURGICAL REMOVAL OF INTERVERTEBRAL DISC OF SPINE USING MICROSCOPE Right 1/5/2022    Procedure: DISCECTOMY,  SPINE, MINIMALLY INVASIVE, USING MICROSCOPE; Right L4-5; 3hrs duration; prone; C-arm; Microscope; METRx tubes; Damian flat with Chris frame;;  Surgeon: Oseas Dumont MD;  Location: [facility] MAIN OR;  Service: Neurosurgery;  Laterality: Right;     Family History   Problem Relation Age of Onset    Arthritis Father     Asthma Father     COPD Father     Stroke Father      Social History     Tobacco Use    Smoking status: Every Day     Packs/day: 0.50     Types: Cigarettes    Smokeless tobacco: Current   Substance Use Topics    Alcohol use: Never    Drug use: Yes     Types: Marijuana     Review of Systems   Constitutional:  Negative for chills, fatigue and fever.   HENT:  Negative for congestion, sore throat and trouble swallowing.    Eyes:  Negative for pain and visual disturbance.   Respiratory:  Negative for cough, shortness of breath and wheezing.    Cardiovascular:  Negative for chest pain and palpitations.   Gastrointestinal:  Positive for diarrhea. Negative for abdominal pain, blood in stool, constipation, nausea and vomiting.   Genitourinary:  Negative for dysuria and hematuria.   Musculoskeletal:  Positive for myalgias. Negative for back pain.   Skin:  Negative for rash and wound.   Neurological:  Positive for tremors. Negative for dizziness, seizures, syncope, facial asymmetry, speech difficulty, weakness, light-headedness, numbness and headaches.   Psychiatric/Behavioral:  Negative for confusion. The patient is not nervous/anxious.      Physical Exam     Initial Vitals [06/17/23 0122]   BP Pulse Resp Temp SpO2   (!) 136/95 95 18 98.2 °F (36.8 °C) 99 %      MAP       --         Physical Exam    Nursing note and vitals reviewed.  Constitutional: He appears well-developed and well-nourished. No distress.   HENT:   Head: Normocephalic and atraumatic.   Eyes: Conjunctivae and EOM are normal. Right eye exhibits no discharge. Left eye exhibits no discharge. No scleral icterus.   Neck: No tracheal deviation present.  "  Normal range of motion.  Cardiovascular:  Normal rate, regular rhythm and normal heart sounds.     Exam reveals no gallop and no friction rub.       No murmur heard.  Pulmonary/Chest: Breath sounds normal. No respiratory distress. He has no wheezes. He has no rhonchi. He has no rales.   Musculoskeletal:         General: No edema. Normal range of motion.      Cervical back: Normal range of motion.     Neurological: He is alert. He has normal strength.   Skin: Skin is warm and dry. No rash and no abscess noted. No erythema. No pallor.   Psychiatric: His behavior is normal. Judgment normal.       ED Course   Procedures  Labs Reviewed - No data to display       Imaging Results    None          Medications   0.9%  NaCl infusion (1,000 mLs Intravenous New Bag 6/17/23 0155)   dicyclomine injection 20 mg (20 mg Intramuscular Given 6/17/23 0149)     Medical Decision Making:   Initial Assessment:   Overall no acute distress 42-year-old male  Differential Diagnosis:   Opiate withdrawal, rhabdomyolysis, dehydration  ED Management:  Vital signs stable patient is afebrile   Chronically user and states that these is the same symptoms that he develops when he stops using opiates  Patient recently stopped using opiates 3 days ago   Patient would like to "push through withdrawal. "  Fluids and Bentyl seemed to improve this patient earlier today and thus will repeated this time   Labs were evaluated and reviewed from earlier today and noted to be largely unremarkable   Fluids and bentyl did not provide the level of relief that he received from earlier  Pt states he feels he can "push through" these withdrawal symptoms  Advised if we are correct and this is opiate withdrawal that it is unlikely to be life threatening and encouraged him to refrain from obtainin opites  He agreed and seems to want to make a change.   All questions were answered in layman terms  Return precautions discussed.                             Clinical " Impression:   Final diagnoses:  [F11.93] Opiate withdrawal (Primary)        ED Disposition Condition    Discharge Stable          ED Prescriptions    None       Follow-up Information       Follow up With Specialties Details Why Contact Info    [facility] Emergency [facility]  If symptoms worsen [address] [facility] [address]  [phone]    Eulalio Puckett NP Family Medicine Schedule an appointment as soon as possible for a visit   [address] [place] [address] [address] [address]  [phone]               Wander Sandhu MD  06/17/23 0249

## 2023-08-09 ENCOUNTER — LAB VISIT (OUTPATIENT)
Dept: LAB | Facility: HOSPITAL | Age: 43
End: 2023-08-09
Attending: NURSE PRACTITIONER
Payer: MEDICARE

## 2023-08-09 DIAGNOSIS — N40.1 BENIGN PROSTATIC HYPERPLASIA (BPH) WITH STRAINING ON URINATION: ICD-10-CM

## 2023-08-09 DIAGNOSIS — R39.16 BENIGN PROSTATIC HYPERPLASIA (BPH) WITH STRAINING ON URINATION: ICD-10-CM

## 2023-08-09 LAB — PSA SERPL-MCNC: 1.1 NG/ML

## 2023-08-09 PROCEDURE — 36415 COLL VENOUS BLD VENIPUNCTURE: CPT

## 2023-08-09 PROCEDURE — 84153 ASSAY OF PSA TOTAL: CPT

## 2023-08-16 ENCOUNTER — OFFICE VISIT (OUTPATIENT)
Dept: UROLOGY | Facility: CLINIC | Age: 43
End: 2023-08-16
Payer: MEDICARE

## 2023-08-16 VITALS
HEIGHT: 70 IN | RESPIRATION RATE: 20 BRPM | DIASTOLIC BLOOD PRESSURE: 86 MMHG | TEMPERATURE: 98 F | WEIGHT: 138 LBS | BODY MASS INDEX: 19.76 KG/M2 | OXYGEN SATURATION: 100 % | SYSTOLIC BLOOD PRESSURE: 125 MMHG | HEART RATE: 69 BPM

## 2023-08-16 DIAGNOSIS — R39.16 BENIGN PROSTATIC HYPERPLASIA (BPH) WITH STRAINING ON URINATION: Primary | ICD-10-CM

## 2023-08-16 DIAGNOSIS — N40.1 BENIGN PROSTATIC HYPERPLASIA (BPH) WITH STRAINING ON URINATION: Primary | ICD-10-CM

## 2023-08-16 LAB
BILIRUB SERPL-MCNC: NEGATIVE MG/DL
BLOOD URINE, POC: NORMAL
COLOR, POC UA: YELLOW
GLUCOSE UR QL STRIP: NEGATIVE
KETONES UR QL STRIP: NEGATIVE
LEUKOCYTE ESTERASE URINE, POC: NEGATIVE
NITRITE, POC UA: NEGATIVE
PH, POC UA: 6
POC RESIDUAL URINE VOLUME: 5 ML (ref 0–100)
PROTEIN, POC: NEGATIVE
SPECIFIC GRAVITY, POC UA: 1.03
UROBILINOGEN, POC UA: 0.2

## 2023-08-16 PROCEDURE — 1160F PR REVIEW ALL MEDS BY PRESCRIBER/CLIN PHARMACIST DOCUMENTED: ICD-10-PCS | Mod: CPTII,,, | Performed by: NURSE PRACTITIONER

## 2023-08-16 PROCEDURE — 3008F BODY MASS INDEX DOCD: CPT | Mod: CPTII,,, | Performed by: NURSE PRACTITIONER

## 2023-08-16 PROCEDURE — 99214 OFFICE O/P EST MOD 30 MIN: CPT | Mod: PBBFAC | Performed by: NURSE PRACTITIONER

## 2023-08-16 PROCEDURE — 3008F PR BODY MASS INDEX (BMI) DOCUMENTED: ICD-10-PCS | Mod: CPTII,,, | Performed by: NURSE PRACTITIONER

## 2023-08-16 PROCEDURE — 3074F SYST BP LT 130 MM HG: CPT | Mod: CPTII,,, | Performed by: NURSE PRACTITIONER

## 2023-08-16 PROCEDURE — 1160F RVW MEDS BY RX/DR IN RCRD: CPT | Mod: CPTII,,, | Performed by: NURSE PRACTITIONER

## 2023-08-16 PROCEDURE — 3079F DIAST BP 80-89 MM HG: CPT | Mod: CPTII,,, | Performed by: NURSE PRACTITIONER

## 2023-08-16 PROCEDURE — 99213 OFFICE O/P EST LOW 20 MIN: CPT | Mod: S$PBB,,, | Performed by: NURSE PRACTITIONER

## 2023-08-16 PROCEDURE — 81001 URINALYSIS AUTO W/SCOPE: CPT | Mod: PBBFAC | Performed by: NURSE PRACTITIONER

## 2023-08-16 PROCEDURE — 1159F PR MEDICATION LIST DOCUMENTED IN MEDICAL RECORD: ICD-10-PCS | Mod: CPTII,,, | Performed by: NURSE PRACTITIONER

## 2023-08-16 PROCEDURE — 3074F PR MOST RECENT SYSTOLIC BLOOD PRESSURE < 130 MM HG: ICD-10-PCS | Mod: CPTII,,, | Performed by: NURSE PRACTITIONER

## 2023-08-16 PROCEDURE — 3079F PR MOST RECENT DIASTOLIC BLOOD PRESSURE 80-89 MM HG: ICD-10-PCS | Mod: CPTII,,, | Performed by: NURSE PRACTITIONER

## 2023-08-16 PROCEDURE — 51798 US URINE CAPACITY MEASURE: CPT | Mod: PBBFAC | Performed by: NURSE PRACTITIONER

## 2023-08-16 PROCEDURE — 99213 PR OFFICE/OUTPT VISIT, EST, LEVL III, 20-29 MIN: ICD-10-PCS | Mod: S$PBB,,, | Performed by: NURSE PRACTITIONER

## 2023-08-16 PROCEDURE — 1159F MED LIST DOCD IN RCRD: CPT | Mod: CPTII,,, | Performed by: NURSE PRACTITIONER

## 2023-08-16 RX ORDER — OXYCODONE HYDROCHLORIDE 5 MG/1
5 TABLET ORAL EVERY 4 HOURS PRN
COMMUNITY
Start: 2023-08-03

## 2023-08-16 RX ORDER — DIAZEPAM 10 MG/1
10 TABLET ORAL 2 TIMES DAILY PRN
COMMUNITY
Start: 2023-07-03

## 2023-08-16 NOTE — PROGRESS NOTES
Called Mom back and per the appointment from 5/20/20, Dr. Sharma wanted to check Chava's vitamin D level mid-winter.    Mom states that they have an [facility] near them so they will go there.    Advised her that we will be back in contact once we get results.    Mom verbalized understanding and thanks and denied any further questions.    Orders entered    Encounter closed   Patient seen by KAROL Blanchard NP will return in 6 months with PSA. Written and verbal discharge instructions given.

## 2023-08-16 NOTE — PROGRESS NOTES
Chief Complaint: No chief complaint on file.      HPI: Patient is a 42-year-old male here for 6 month follow-up for BPH.  Patient currently on Flomax 0.8 mg p.o. daily for BPH.  PSA 1.10.  Patient states improved urinary retention symptoms patient voiding very well now that Flomax has been increased.  Today patient presents without any symptoms of dysuria, urinary urgency, urinary frequency, urinary hesitancy, urinary dribbling, gross hematuria.  Bladder scan 5 mL.    Allergies:  Review of patient's allergies indicates:   Allergen Reactions    Iodine and iodide containing products        Medications:  Current Outpatient Medications   Medication Sig Dispense Refill    diazePAM (VALIUM) 10 MG Tab Take 10 mg by mouth 2 (two) times daily as needed.      gabapentin (NEURONTIN) 800 MG tablet Take 800 mg by mouth.      oxyCODONE (ROXICODONE) 5 MG immediate release tablet Take 5 mg by mouth every 4 (four) hours as needed. for pain.      tamsulosin (FLOMAX) 0.4 mg Cap Take 2 capsules (0.8 mg total) by mouth once daily. 180 capsule 3    cyclobenzaprine (FLEXERIL) 10 MG tablet Take 10 mg by mouth every evening.      dicyclomine (BENTYL) 10 MG capsule Take 1 capsule (10 mg total) by mouth 2 (two) times daily as needed. (Patient not taking: Reported on 8/16/2023) 12 capsule 0    oxyCODONE (ROXICODONE) 15 MG Tab Take 15 mg by mouth every 6 (six) hours as needed.      oxyCODONE-acetaminophen (PERCOCET)  mg per tablet Take 1 tablet by mouth every 6 (six) hours as needed.      pregabalin (LYRICA) 150 MG capsule Take 150 mg by mouth 2 (two) times daily.       No current facility-administered medications for this visit.       Review of Systems:  General: No fever, chills, fatigability, or weight loss.  Skin: No rashes, itching, or changes in color or texture of skin.  Chest: Denies PATEL, cyanosis, wheezing, cough, and sputum production.  Abdomen: Appetite fine. No weight loss. Denies diarrhea, abdominal pain, hematemesis, or  blood in stool.  Musculoskeletal: No joint stiffness or swelling. Denies back pain.  : As above.  All other review of systems negative.    PMH:  History reviewed. No pertinent past medical history.    PSH:  Past Surgical History:   Procedure Laterality Date    EPIDURAL STEROID INJECTION Bilateral 10/7/2021    Procedure: Injection, Steroid, Epidural Interlaminar L4-L5;  Surgeon: Isamar Vazquez MD;  Location: [facility] MAIN OR;  Service: Anesthesiology;  Laterality: Bilateral;    EPIDURAL STEROID INJECTION INTO LUMBAR SPINE N/A 10/28/2021    Procedure: Injection-steroid-epidural-lumbar;  Surgeon: Isamar Vazquez MD;  Location: [facility] MAIN OR;  Service: Anesthesiology;  Laterality: N/A;    MINIMALLY INVASIVE SURGICAL REMOVAL OF INTERVERTEBRAL DISC OF SPINE USING MICROSCOPE Right 1/5/2022    Procedure: DISCECTOMY, SPINE, MINIMALLY INVASIVE, USING MICROSCOPE; Right L4-5; 3hrs duration; prone; C-arm; Microscope; METRx tubes; Damian flat with Chris frame;;  Surgeon: Oseas Dumont MD;  Location: [facility] MAIN OR;  Service: Neurosurgery;  Laterality: Right;       FamHx:  Family History   Problem Relation Age of Onset    Arthritis Father     Asthma Father     COPD Father     Stroke Father        SocHx:  Social History     Socioeconomic History    Marital status: Significant Other   Tobacco Use    Smoking status: Every Day     Current packs/day: 0.50     Types: Cigarettes    Smokeless tobacco: Current   Substance and Sexual Activity    Alcohol use: Never    Drug use: Yes     Types: Marijuana    Sexual activity: Not Currently       Physical Exam:  Vitals:    08/16/23 1226   BP: 125/86   Pulse: 69   Resp: 20   Temp: 98.1 °F (36.7 °C)     General: A&Ox3, no apparent distress, no deformities  Neck: No masses, normal thyroid  Lungs: CTA keenan, no use of accessory muscles  Heart: RRR, no arrhythmias  Abdomen: Soft, NT, ND, no masses, no hernias, no hepatosplenomegaly  Lymphatic: Neck and groin nodes negative  Skin: The skin is warm and  dry. No jaundice.  Ext: No c/c/e.    GUMale: Test desc keenan, no abnormalities of epididymus. Penis circumcised, with normal penile and scrotal skin. Meatus normal. Normal rectal tone, no hemorrhoids. Prostate: 2 finger breadth wide, flat, smooth, soft, nontender, no nodules or masses appreciated. SV not palpable. Perineum and anus normal.    Labs:     Recent Labs     08/09/23  1012   PSA 1.10      Urinalysis:  Color, UA Yellow    Spec Grav UA 1.030    pH, UA 6.0    WBC, UA Negative    Nitrite, UA Negative    Protein, POC Negative    Glucose, UA Negative    Ketones, UA Negative    Urobilinogen, UA 0.2    Bilirubin, POC Negative    Blood, UA Trace-intact               Specimen Collected: 08/16/23 12:50         Microscopic urinalysis revealed trace RBCs, negative for WBCs nitrites.    Impression:  1. Benign prostatic hyperplasia (BPH) with straining on urination  - POCT URINE DIPSTICK WITH MICROSCOPE, AUTOMATED      Plan: Instructed patient continue Flomax 0.8 mg p.o. daily RTC 6 months with PSA on timed voiding every 2-3 hrs, double voiding, avoidance of bladder irritants such as alcohol, citrus foods, chocolate, caffeinated drinks, energy drinks, spicy foods, sugar, caffeine products, sodas. Instructed patient to avoid drinking fluids 1-2 hours prior to bedtime & void immediately before bedtime.

## 2024-02-28 ENCOUNTER — OFFICE VISIT (OUTPATIENT)
Dept: UROLOGY | Facility: CLINIC | Age: 44
End: 2024-02-28
Payer: MEDICARE

## 2024-02-28 ENCOUNTER — LAB VISIT (OUTPATIENT)
Dept: LAB | Facility: HOSPITAL | Age: 44
End: 2024-02-28
Attending: NURSE PRACTITIONER
Payer: MEDICARE

## 2024-02-28 VITALS
HEART RATE: 98 BPM | SYSTOLIC BLOOD PRESSURE: 129 MMHG | HEIGHT: 69 IN | DIASTOLIC BLOOD PRESSURE: 86 MMHG | BODY MASS INDEX: 23.25 KG/M2 | OXYGEN SATURATION: 98 % | RESPIRATION RATE: 18 BRPM | TEMPERATURE: 98 F | WEIGHT: 157 LBS

## 2024-02-28 DIAGNOSIS — N40.1 BENIGN PROSTATIC HYPERPLASIA (BPH) WITH STRAINING ON URINATION: ICD-10-CM

## 2024-02-28 DIAGNOSIS — R39.16 BENIGN PROSTATIC HYPERPLASIA (BPH) WITH STRAINING ON URINATION: ICD-10-CM

## 2024-02-28 DIAGNOSIS — N40.1 BENIGN PROSTATIC HYPERPLASIA (BPH) WITH STRAINING ON URINATION: Primary | ICD-10-CM

## 2024-02-28 DIAGNOSIS — R39.16 BENIGN PROSTATIC HYPERPLASIA (BPH) WITH STRAINING ON URINATION: Primary | ICD-10-CM

## 2024-02-28 LAB
BILIRUB SERPL-MCNC: NEGATIVE MG/DL
BLOOD URINE, POC: NEGATIVE
COLOR, POC UA: YELLOW
GLUCOSE UR QL STRIP: NEGATIVE
KETONES UR QL STRIP: NEGATIVE
LEUKOCYTE ESTERASE URINE, POC: NORMAL
NITRITE, POC UA: NEGATIVE
PH, POC UA: 7
PROTEIN, POC: NEGATIVE
PSA SERPL-MCNC: 1.53 NG/ML
SPECIFIC GRAVITY, POC UA: 1.01
UROBILINOGEN, POC UA: 0.2

## 2024-02-28 PROCEDURE — 3008F BODY MASS INDEX DOCD: CPT | Mod: CPTII,,, | Performed by: NURSE PRACTITIONER

## 2024-02-28 PROCEDURE — 81001 URINALYSIS AUTO W/SCOPE: CPT | Mod: PBBFAC | Performed by: NURSE PRACTITIONER

## 2024-02-28 PROCEDURE — 99215 OFFICE O/P EST HI 40 MIN: CPT | Mod: PBBFAC | Performed by: NURSE PRACTITIONER

## 2024-02-28 PROCEDURE — 3079F DIAST BP 80-89 MM HG: CPT | Mod: CPTII,,, | Performed by: NURSE PRACTITIONER

## 2024-02-28 PROCEDURE — 36415 COLL VENOUS BLD VENIPUNCTURE: CPT

## 2024-02-28 PROCEDURE — 99213 OFFICE O/P EST LOW 20 MIN: CPT | Mod: S$PBB,,, | Performed by: NURSE PRACTITIONER

## 2024-02-28 PROCEDURE — 1160F RVW MEDS BY RX/DR IN RCRD: CPT | Mod: CPTII,,, | Performed by: NURSE PRACTITIONER

## 2024-02-28 PROCEDURE — 84153 ASSAY OF PSA TOTAL: CPT

## 2024-02-28 PROCEDURE — 3074F SYST BP LT 130 MM HG: CPT | Mod: CPTII,,, | Performed by: NURSE PRACTITIONER

## 2024-02-28 PROCEDURE — 1159F MED LIST DOCD IN RCRD: CPT | Mod: CPTII,,, | Performed by: NURSE PRACTITIONER

## 2024-02-28 RX ORDER — BUPRENORPHINE HYDROCHLORIDE 8 MG/1
8 TABLET SUBLINGUAL 2 TIMES DAILY
COMMUNITY
Start: 2024-02-07

## 2024-02-28 RX ORDER — ALFUZOSIN HYDROCHLORIDE 10 MG/1
10 TABLET, EXTENDED RELEASE ORAL
Qty: 90 TABLET | Refills: 3 | Status: SHIPPED | OUTPATIENT
Start: 2024-02-28 | End: 2024-03-08

## 2024-02-28 RX ORDER — ALFUZOSIN HYDROCHLORIDE 10 MG/1
10 TABLET, EXTENDED RELEASE ORAL
Qty: 90 TABLET | Refills: 3 | Status: SHIPPED | OUTPATIENT
Start: 2024-02-28 | End: 2024-02-28 | Stop reason: SDUPTHER

## 2024-02-28 NOTE — PROGRESS NOTES
Chief Complaint:   Chief Complaint   Patient presents with    Dysuria       HPI:  Patient is a 43-year-old male here for 6 month follow-up for BPH.  Patient currently on Flomax 0.8 mg p.o. daily for BPH.  Patient's last PSA 1.10, patient's current PSA 1.53.  Today patient presents with mild to moderate decreased urine flow would like to seek alternative treatment method.  My plan is to discontinue tamsulosin start alfuzosin 10 mg p.o. daily referral to Dr. Osullivan for a UroLift consult.  I discussed with patient at length possible option of UroLift versus butterfly versus a TURP patient chose to see Dr. Osullivan and discuss results of a UroLift procedure.    Allergies:  Review of patient's allergies indicates:   Allergen Reactions    Iodine and iodide containing products        Medications:  Current Outpatient Medications   Medication Sig Dispense Refill    buprenorphine HCL (SUBUTEX) 8 mg Subl Place 8 mg under the tongue 2 (two) times daily.      gabapentin (NEURONTIN) 800 MG tablet Take 800 mg by mouth.      cyclobenzaprine (FLEXERIL) 10 MG tablet Take 10 mg by mouth every evening.      diazePAM (VALIUM) 10 MG Tab Take 10 mg by mouth 2 (two) times daily as needed.      dicyclomine (BENTYL) 10 MG capsule Take 1 capsule (10 mg total) by mouth 2 (two) times daily as needed. (Patient not taking: Reported on 8/16/2023) 12 capsule 0    oxyCODONE (ROXICODONE) 15 MG Tab Take 15 mg by mouth every 6 (six) hours as needed.      oxyCODONE (ROXICODONE) 5 MG immediate release tablet Take 5 mg by mouth every 4 (four) hours as needed. for pain.      oxyCODONE-acetaminophen (PERCOCET)  mg per tablet Take 1 tablet by mouth every 6 (six) hours as needed.      pregabalin (LYRICA) 150 MG capsule Take 150 mg by mouth 2 (two) times daily.      tamsulosin (FLOMAX) 0.4 mg Cap Take 2 capsules (0.8 mg total) by mouth once daily. 180 capsule 3     No current facility-administered medications for this visit.       Review of Systems:  General:  No fever, chills, fatigability, or weight loss.  Skin: No rashes, itching, or changes in color or texture of skin.  Chest: Denies PATEL, cyanosis, wheezing, cough, and sputum production.  Abdomen: Appetite fine. No weight loss. Denies diarrhea, abdominal pain, hematemesis, or blood in stool.  Musculoskeletal: No joint stiffness or swelling. Denies back pain.  : As above.  All other review of systems negative.    PMH:  History reviewed. No pertinent past medical history.    PSH:  Past Surgical History:   Procedure Laterality Date    EPIDURAL STEROID INJECTION Bilateral 10/7/2021    Procedure: Injection, Steroid, Epidural Interlaminar L4-L5;  Surgeon: Isamar Vazquez MD;  Location: [facility] MAIN OR;  Service: Anesthesiology;  Laterality: Bilateral;    EPIDURAL STEROID INJECTION INTO LUMBAR SPINE N/A 10/28/2021    Procedure: Injection-steroid-epidural-lumbar;  Surgeon: Isamar Vazquez MD;  Location: [facility] MAIN OR;  Service: Anesthesiology;  Laterality: N/A;    MINIMALLY INVASIVE SURGICAL REMOVAL OF INTERVERTEBRAL DISC OF SPINE USING MICROSCOPE Right 1/5/2022    Procedure: DISCECTOMY, SPINE, MINIMALLY INVASIVE, USING MICROSCOPE; Right L4-5; 3hrs duration; prone; C-arm; Microscope; METRx tubes; Damian flat with Chris frame;;  Surgeon: Oseas Dumont MD;  Location: [facility] MAIN OR;  Service: Neurosurgery;  Laterality: Right;       FamHx:  Family History   Problem Relation Age of Onset    Arthritis Father     Asthma Father     COPD Father     Stroke Father        SocHx:  Social History     Socioeconomic History    Marital status:    Tobacco Use    Smoking status: Every Day     Current packs/day: 0.50     Types: Cigarettes    Smokeless tobacco: Current   Substance and Sexual Activity    Alcohol use: Never    Drug use: Yes     Types: Marijuana    Sexual activity: Not Currently       Physical Exam:  Vitals:    02/28/24 1227   BP: 129/86   Pulse: 98   Resp: 18   Temp: 98.2 °F (36.8 °C)     General: A&Ox3, no apparent  distress, no deformities  Neck: No masses, normal thyroid  Lungs: CTA keenan, no use of accessory muscles  Heart: RRR, no arrhythmias  Abdomen: Soft, NT, ND, no masses, no hernias, no hepatosplenomegaly  Lymphatic: Neck and groin nodes negative  Skin: The skin is warm and dry. No jaundice.  Ext: No c/c/e.    GUMale:  Deferred WESLEY exam at this time.  Labs:    Latest Reference Range & Units 09/14/22 14:00 08/09/23 10:12 02/28/24 12:05   Prostate Specific Antigen <=4.00 ng/mL 0.92 1.10 1.53     Urinalysis:  Results for orders placed or performed in visit on 02/28/24   POCT URINE DIPSTICK WITH MICROSCOPE, AUTOMATED   Result Value Ref Range    Color, UA Yellow     Spec Grav UA 1.015     pH, UA 7.0     WBC, UA Trace     Nitrite, UA Negative     Protein, POC Negative     Glucose, UA Negative     Ketones, UA Negative     Urobilinogen, UA 0.2     Bilirubin, POC Negative     Blood, UA Negative    Microscopic urinalysis negative for RBCs, nitrites, trace WBCs present.          Impression:  1. Benign prostatic hyperplasia (BPH) with straining on urination  - POCT URINE DIPSTICK WITH MICROSCOPE, AUTOMATED      Plan: Instructed patient discontinue tamsulosin start alfuzosin 10 mg p.o. daily referral to Dr. Posada for UroLift consult.  RTC 3 months for re-evaluation.  Reinforced patient teaching on timed voiding every 2-3 hrs, double voiding, avoidance of bladder irritants such as alcohol, citrus foods, chocolate, caffeinated drinks, energy drinks, spicy foods, sugar, caffeine products, sodas. Instructed patient to avoid drinking fluids 1-2 hours prior to bedtime & void immediately before bedtime.

## 2024-03-08 ENCOUNTER — OFFICE VISIT (OUTPATIENT)
Dept: UROLOGY | Facility: CLINIC | Age: 44
End: 2024-03-08
Payer: MEDICARE

## 2024-03-08 DIAGNOSIS — R39.16 BENIGN PROSTATIC HYPERPLASIA (BPH) WITH STRAINING ON URINATION: ICD-10-CM

## 2024-03-08 DIAGNOSIS — N40.1 BENIGN PROSTATIC HYPERPLASIA (BPH) WITH STRAINING ON URINATION: Primary | ICD-10-CM

## 2024-03-08 DIAGNOSIS — R39.16 BENIGN PROSTATIC HYPERPLASIA (BPH) WITH STRAINING ON URINATION: Primary | ICD-10-CM

## 2024-03-08 DIAGNOSIS — N40.1 BENIGN PROSTATIC HYPERPLASIA (BPH) WITH STRAINING ON URINATION: ICD-10-CM

## 2024-03-08 PROCEDURE — 1160F RVW MEDS BY RX/DR IN RCRD: CPT | Mod: CPTII,95,, | Performed by: NURSE PRACTITIONER

## 2024-03-08 PROCEDURE — 1159F MED LIST DOCD IN RCRD: CPT | Mod: CPTII,95,, | Performed by: NURSE PRACTITIONER

## 2024-03-08 PROCEDURE — 99442 PR PHYSICIAN TELEPHONE EVALUATION 11-20 MIN: CPT | Mod: 95,,, | Performed by: NURSE PRACTITIONER

## 2024-03-08 RX ORDER — TAMSULOSIN HYDROCHLORIDE 0.4 MG/1
0.4 CAPSULE ORAL DAILY
Qty: 90 CAPSULE | Refills: 3 | Status: SHIPPED | OUTPATIENT
Start: 2024-03-08 | End: 2024-03-11

## 2024-03-08 RX ORDER — TAMSULOSIN HYDROCHLORIDE 0.4 MG/1
0.4 CAPSULE ORAL DAILY
Qty: 90 CAPSULE | Refills: 3 | Status: SHIPPED | OUTPATIENT
Start: 2024-03-08 | End: 2024-03-08 | Stop reason: SDUPTHER

## 2024-03-08 NOTE — PROGRESS NOTES
Established Patient - Audio Only Telehealth Visit     The patient location is:  Home  The chief complaint leading to consultation is:  Recurrent weak urine flow  Visit type: Virtual visit with audio only (telephone)  Total time spent with patient:  20 minutes     The reason for the audio only service rather than synchronous audio virtual visit was related to technical difficulties or patient preference/necessity.     Each patient to whom I provide medical services by telemedicine is:  (1) informed of the relationship between the physician and patient and the respective role of any other health care provider with respect to management of the patient; and (2) notified that they may decline to receive medical services by telemedicine and may withdraw from such care at any time. Patient verbally consented to receive this service via voice-only telephone call.     This service was not originating from a related E/M service provided within the previous 7 days nor will  to an E/M service or procedure within the next 24 hours or my soonest available appointment.  Prevailing standard of care was able to be met in this audio-only visit.      Chief Complaint:  Weak urine stream urinary retention      HPI:  Patient is a 43-year-old male on this audio virtual visit due to recent med change from Flomax to alfuzosin.  Patient states today his symptoms of worsen he would like to switch back to tamsulosin.  I discussed with patient that I would like to try Rapaflo 8 mg p.o. daily and also add finasteride 5 mg p.o. daily RTC 3 months for re-evaluation.  Instructed patient develops any abnormal urologic symptoms or worsening symptoms notify clinic to be re-evaluated treated and we will switch back to tamsulosin however continue finasteride for total of 6 months to assess patient's symptoms.  If patient develops any erectile dysfunction notify clinic in we can add Cialis to correct these symptoms.    Allergies:  Review of  patient's allergies indicates:   Allergen Reactions    Iodine and iodide containing products        Medications:  Current Outpatient Medications   Medication Sig Dispense Refill    alfuzosin (UROXATRAL) 10 mg Tb24 Take 1 tablet (10 mg total) by mouth daily with breakfast. 90 tablet 3    buprenorphine HCL (SUBUTEX) 8 mg Subl Place 8 mg under the tongue 2 (two) times daily.      cyclobenzaprine (FLEXERIL) 10 MG tablet Take 10 mg by mouth every evening.      diazePAM (VALIUM) 10 MG Tab Take 10 mg by mouth 2 (two) times daily as needed.      dicyclomine (BENTYL) 10 MG capsule Take 1 capsule (10 mg total) by mouth 2 (two) times daily as needed. (Patient not taking: Reported on 8/16/2023) 12 capsule 0    gabapentin (NEURONTIN) 800 MG tablet Take 800 mg by mouth.      oxyCODONE (ROXICODONE) 15 MG Tab Take 15 mg by mouth every 6 (six) hours as needed.      oxyCODONE (ROXICODONE) 5 MG immediate release tablet Take 5 mg by mouth every 4 (four) hours as needed. for pain.      oxyCODONE-acetaminophen (PERCOCET)  mg per tablet Take 1 tablet by mouth every 6 (six) hours as needed.      pregabalin (LYRICA) 150 MG capsule Take 150 mg by mouth 2 (two) times daily.      tamsulosin (FLOMAX) 0.4 mg Cap Take 2 capsules (0.8 mg total) by mouth once daily. 180 capsule 3     No current facility-administered medications for this visit.       Review of Systems:  General: No fever, chills, fatigability, or weight loss.  Skin: No rashes, itching, or changes in color or texture of skin.  Chest: Denies PATEL, cyanosis, wheezing, cough, and sputum production.  Abdomen: Appetite fine. No weight loss. Denies diarrhea, abdominal pain, hematemesis, or blood in stool.  Musculoskeletal: No joint stiffness or swelling. Denies back pain.  : As above.  All other review of systems negative.    PMH:  No past medical history on file.    PSH:  Past Surgical History:   Procedure Laterality Date    EPIDURAL STEROID INJECTION Bilateral 10/7/2021     Procedure: Injection, Steroid, Epidural Interlaminar L4-L5;  Surgeon: Isamar Vazquez MD;  Location: [facility] MAIN OR;  Service: Anesthesiology;  Laterality: Bilateral;    EPIDURAL STEROID INJECTION INTO LUMBAR SPINE N/A 10/28/2021    Procedure: Injection-steroid-epidural-lumbar;  Surgeon: Isamar Vazquez MD;  Location: [facility] MAIN OR;  Service: Anesthesiology;  Laterality: N/A;    MINIMALLY INVASIVE SURGICAL REMOVAL OF INTERVERTEBRAL DISC OF SPINE USING MICROSCOPE Right 1/5/2022    Procedure: DISCECTOMY, SPINE, MINIMALLY INVASIVE, USING MICROSCOPE; Right L4-5; 3hrs duration; prone; C-arm; Microscope; METRx tubes; Damian flat with Chris frame;;  Surgeon: Oseas Dumont MD;  Location: [facility] MAIN OR;  Service: Neurosurgery;  Laterality: Right;       FamHx:  Family History   Problem Relation Age of Onset    Arthritis Father     Asthma Father     COPD Father     Stroke Father        SocHx:  Social History     Socioeconomic History    Marital status:    Tobacco Use    Smoking status: Every Day     Current packs/day: 0.50     Types: Cigarettes    Smokeless tobacco: Current   Substance and Sexual Activity    Alcohol use: Never    Drug use: Yes     Types: Marijuana    Sexual activity: Not Currently       Physical Exam:  There were no vitals filed for this visit.        Impression:  Urinary retention    Plan:  Instructed patient discontinue alfuzosin start Rapaflo 8 mg p.o. daily, start finasteride 5 mg p.o. daily RTC previous office visit 3 months already scheduled.  Reinforced patient teaching  on timed voiding every 2-3 hrs, double voiding, avoidance of bladder irritants such as alcohol, citrus foods, chocolate, caffeinated drinks, energy drinks, spicy foods, sugar, caffeine products, sodas. Instructed patient to avoid drinking fluids 1-2 hours prior to bedtime & void immediately before bedtime.

## 2024-03-11 DIAGNOSIS — N40.1 BENIGN PROSTATIC HYPERPLASIA (BPH) WITH STRAINING ON URINATION: Primary | ICD-10-CM

## 2024-03-11 DIAGNOSIS — R39.16 BENIGN PROSTATIC HYPERPLASIA (BPH) WITH STRAINING ON URINATION: Primary | ICD-10-CM

## 2024-03-11 RX ORDER — FINASTERIDE 5 MG/1
5 TABLET, FILM COATED ORAL DAILY
Qty: 90 TABLET | Refills: 3 | Status: SHIPPED | OUTPATIENT
Start: 2024-03-11 | End: 2025-03-11

## 2024-03-11 RX ORDER — SILODOSIN 8 MG/1
8 CAPSULE ORAL DAILY
Qty: 90 CAPSULE | Refills: 3 | Status: SHIPPED | OUTPATIENT
Start: 2024-03-11 | End: 2025-03-11

## 2024-06-19 ENCOUNTER — OFFICE VISIT (OUTPATIENT)
Dept: UROLOGY | Facility: CLINIC | Age: 44
End: 2024-06-19
Payer: MEDICARE

## 2024-06-19 VITALS
DIASTOLIC BLOOD PRESSURE: 74 MMHG | HEART RATE: 88 BPM | WEIGHT: 157 LBS | OXYGEN SATURATION: 98 % | BODY MASS INDEX: 23.25 KG/M2 | SYSTOLIC BLOOD PRESSURE: 115 MMHG | TEMPERATURE: 98 F | HEIGHT: 69 IN

## 2024-06-19 DIAGNOSIS — N40.0 BENIGN PROSTATIC HYPERPLASIA WITHOUT LOWER URINARY TRACT SYMPTOMS: Primary | ICD-10-CM

## 2024-06-19 LAB
BILIRUB SERPL-MCNC: NORMAL MG/DL
BLOOD URINE, POC: NORMAL
COLOR, POC UA: YELLOW
GLUCOSE UR QL STRIP: NORMAL
KETONES UR QL STRIP: NORMAL
LEUKOCYTE ESTERASE URINE, POC: NORMAL
NITRITE, POC UA: NORMAL
PH, POC UA: 6.5
POC RESIDUAL URINE VOLUME: 35 ML (ref 0–100)
PROTEIN, POC: NORMAL
SPECIFIC GRAVITY, POC UA: 1.02
UROBILINOGEN, POC UA: 0.2

## 2024-06-19 PROCEDURE — 3078F DIAST BP <80 MM HG: CPT | Mod: CPTII,,, | Performed by: NURSE PRACTITIONER

## 2024-06-19 PROCEDURE — 99213 OFFICE O/P EST LOW 20 MIN: CPT | Mod: S$PBB,,, | Performed by: NURSE PRACTITIONER

## 2024-06-19 PROCEDURE — 3074F SYST BP LT 130 MM HG: CPT | Mod: CPTII,,, | Performed by: NURSE PRACTITIONER

## 2024-06-19 PROCEDURE — 1159F MED LIST DOCD IN RCRD: CPT | Mod: CPTII,,, | Performed by: NURSE PRACTITIONER

## 2024-06-19 PROCEDURE — 51798 US URINE CAPACITY MEASURE: CPT | Mod: PBBFAC | Performed by: NURSE PRACTITIONER

## 2024-06-19 PROCEDURE — 99214 OFFICE O/P EST MOD 30 MIN: CPT | Mod: PBBFAC,25 | Performed by: NURSE PRACTITIONER

## 2024-06-19 PROCEDURE — 3008F BODY MASS INDEX DOCD: CPT | Mod: CPTII,,, | Performed by: NURSE PRACTITIONER

## 2024-06-19 PROCEDURE — 81001 URINALYSIS AUTO W/SCOPE: CPT | Mod: PBBFAC | Performed by: NURSE PRACTITIONER

## 2024-06-19 RX ORDER — BUPRENORPHINE 300 MG/1
SOLUTION SUBCUTANEOUS
COMMUNITY
Start: 2024-05-17

## 2024-06-19 RX ORDER — DARIDOREXANT 25 MG/1
1 TABLET, FILM COATED ORAL
COMMUNITY
Start: 2024-06-14

## 2024-06-19 NOTE — PROGRESS NOTES
Chief Complaint:   Chief Complaint   Patient presents with    Urinary Retention     3 month followup doing better gets up only once at night to urinate. No other complaints noted       HPI:   Patient is a 43-year-old male here for three-month follow-up for BPH and weak urine stream.  Patient let failed a tamsulosin trial and also on alfuzosin trial in the recent past therefore he was switched to Rapaflo 8 mg p.o. daily on last patient visit and also finasteride 5 mg p.o. daily added to his regimen.  Today patient presents  with much improved symptoms of urinary frequency, urgency feelings of incomplete emptying at this time bladder scan was 35 mL.    Allergies:  Review of patient's allergies indicates:   Allergen Reactions    Iodine and iodide containing products        Medications:  Current Outpatient Medications   Medication Sig Dispense Refill    QUVIVIQ 25 mg Tab Take 1 tablet by mouth.      SUBLOCADE 300 mg/1.5 mL injection Inject into the skin every 30 days.      buprenorphine HCL (SUBUTEX) 8 mg Subl Place 8 mg under the tongue 2 (two) times daily.      cyclobenzaprine (FLEXERIL) 10 MG tablet Take 10 mg by mouth every evening.      diazePAM (VALIUM) 10 MG Tab Take 10 mg by mouth 2 (two) times daily as needed.      dicyclomine (BENTYL) 10 MG capsule Take 1 capsule (10 mg total) by mouth 2 (two) times daily as needed. (Patient not taking: Reported on 8/16/2023) 12 capsule 0    finasteride (PROSCAR) 5 mg tablet Take 1 tablet (5 mg total) by mouth once daily. 90 tablet 3    gabapentin (NEURONTIN) 800 MG tablet Take 800 mg by mouth.      oxyCODONE (ROXICODONE) 15 MG Tab Take 15 mg by mouth every 6 (six) hours as needed.      oxyCODONE (ROXICODONE) 5 MG immediate release tablet Take 5 mg by mouth every 4 (four) hours as needed. for pain.      oxyCODONE-acetaminophen (PERCOCET)  mg per tablet Take 1 tablet by mouth every 6 (six) hours as needed.      pregabalin (LYRICA) 150 MG capsule Take 150 mg by mouth 2  (two) times daily.      silodosin (RAPAFLO) 8 mg Cap capsule Take 1 capsule (8 mg total) by mouth once daily. 90 capsule 3     No current facility-administered medications for this visit.       Review of Systems:  General: No fever, chills, fatigability, or weight loss.  Skin: No rashes, itching, or changes in color or texture of skin.  Chest: Denies PATEL, cyanosis, wheezing, cough, and sputum production.  Abdomen: Appetite fine. No weight loss. Denies diarrhea, abdominal pain, hematemesis, or blood in stool.  Musculoskeletal: No joint stiffness or swelling. Denies back pain.  :  Denies any dysuria, nocturia, gross hematuria, urinary incontinence, urinary retention.  All other review of systems negative.    PMH:  History reviewed. No pertinent past medical history.    PSH:  Past Surgical History:   Procedure Laterality Date    EPIDURAL STEROID INJECTION Bilateral 10/07/2021    Procedure: Injection, Steroid, Epidural Interlaminar L4-L5;  Surgeon: Isamar Vazquez MD;  Location: [facility] MAIN OR;  Service: Anesthesiology;  Laterality: Bilateral;    EPIDURAL STEROID INJECTION INTO LUMBAR SPINE N/A 10/28/2021    Procedure: Injection-steroid-epidural-lumbar;  Surgeon: Isamar Vazquez MD;  Location: [facility] MAIN OR;  Service: Anesthesiology;  Laterality: N/A;    MINIMALLY INVASIVE SURGICAL REMOVAL OF INTERVERTEBRAL DISC OF SPINE USING MICROSCOPE Right 01/05/2022    Procedure: DISCECTOMY, SPINE, MINIMALLY INVASIVE, USING MICROSCOPE; Right L4-5; 3hrs duration; prone; C-arm; Microscope; METRx tubes; Damian flat with Chris frame;;  Surgeon: Oseas Dumont MD;  Location: [facility] MAIN OR;  Service: Neurosurgery;  Laterality: Right;    SPINE SURGERY  2023    Spinal fusion    TONSILLECTOMY  1997       FamHx:  Family History   Problem Relation Name Age of Onset    Arthritis Father Dad     Asthma Father Dad     COPD Father Dad     Stroke Father Dad        SocHx:  Social History     Socioeconomic History    Marital status:     Tobacco Use    Smoking status: Every Day     Current packs/day: 0.50     Types: Cigarettes    Smokeless tobacco: Current   Substance and Sexual Activity    Alcohol use: Never    Drug use: Yes     Types: Marijuana    Sexual activity: Yes     Partners: Female     Birth control/protection: None       Physical Exam:  Vitals:    06/19/24 0837   BP: 115/74   Pulse: 88   Temp: 98.1 °F (36.7 °C)     General: A&Ox3, no apparent distress, no deformities  Neck: No masses, normal thyroid  Lungs: CTA keenan, no use of accessory muscles  Heart: RRR, no arrhythmias  Abdomen: Soft, NT, ND, no masses, no hernias, no hepatosplenomegaly  Lymphatic: Neck and groin nodes negative  Skin: The skin is warm and dry. No jaundice.  Ext: No c/c/e.    Patient deferred WESLEY examined this time.    Labs:    Latest Reference Range & Units 09/14/22 14:00 08/09/23 10:12 02/28/24 12:05   Prostate Specific Antigen <=4.00 ng/mL 0.92 1.10 1.53       Urinalysis:  Results for orders placed or performed in visit on 06/19/24   POCT URINE DIPSTICK WITH MICROSCOPE, AUTOMATED   Result Value Ref Range    Color, UA Yellow     Spec Grav UA 1.020     pH, UA 6.5     WBC, UA trace     Nitrite, UA neg     Protein, POC neg     Glucose, UA neg     Ketones, UA neg     Urobilinogen, UA 0.2     Bilirubin, POC neg     Blood, UA neg    Microscopic urinalysis negative for RBCs, nitrites, trace WBCs.        Impression:  1. Benign prostatic hyperplasia without lower urinary tract symptoms  - POCT URINE DIPSTICK WITH MICROSCOPE, AUTOMATED  - POCT Bladder Scan      Plan:  Instructed patient continue Rapaflo 8 mg p.o. daily and Proscar 5 mg p.o. daily.  Reinforced patient on timed voiding every 2-3 hrs, double voiding, avoidance of bladder irritants such as alcohol, citrus foods, chocolate, caffeinated drinks, energy drinks, spicy foods, sugar, caffeine products, sodas. Instructed patient to avoid drinking fluids 1-2 hours prior to bedtime & void immediately before bedtime.   RTC  six-month with PSA.  Instructed patient develops any abnormal urologic symptoms notify clinic to be re-evaluate treated.

## 2024-06-19 NOTE — PROGRESS NOTES
Patient seen by KAROL Blanchard Np will return in 6 months with psa. Written and verbal discharge instructions given.

## 2024-06-25 DIAGNOSIS — M54.50 LOW BACK PAIN: Primary | ICD-10-CM

## 2024-06-27 NOTE — PROGRESS NOTES
[facility]  Physical Therapy Initial Evaluation    Name: Eulalio Tena  [facility] Number: [id number]    Therapy Diagnosis:   Encounter Diagnosis   Name Primary?    Chronic low back pain, unspecified back pain laterality, unspecified whether sciatica present Yes     Physician: Eulalio Puckett NP    Physician Orders: PT Eval and Treat  Medical Diagnosis from Referral: Low back pain  Evaluation Date: 6/28/2024  Authorization Period Expiration: None  Plan of Care Expiration: 9/28/2024  Visit # / Visits authorized: 0/24    Time In: 1304  Time Out: 1357  Total Billable Time: 53 minutes    Surgery: L4-S1 posterior fusion  Orthopedic Precautions: None  Pertinent History: Multiple back surgeries with most recent in 2023 June    Subjective     Eulalio reports: Left sided buttock pain that radiates down entire posterior left leg into ankle. States he did PT previously, was told he has leg length discrepancy. In PT they would pop his pelvis/hip back into place, his leg would have tingling and it would feel better for about 2 hours but they would have to redo this each session. THey also did e-stim, dry needling, and stretching. He has tried a heel lift in his shoe to fix LLD but this did not help. Prolonged sitting is the worst and he constantly shuffles and switches positions. Prolonged standing also aggravates condition. He has had multiple back surgeries, most recent being ~1 year ago. He states after most recent surgery symptoms were gone for about 2-3 months then returned.    Imaging  MRI LUMBAR SPINE W WO CONTRAST 6/26/23  FINDINGS:  5 mm of retrolisthesis is present at L5-S1.  Lumbar spine alignment otherwise appears anatomic.  There is new posterior fusion hardware spanning L4-S1, with metallic grafts in the intervening disc spaces.  Laminectomy defects are present at L4 and L5.  Vertebral body and intervertebral disc space heights are maintained. No worrisome marrow signal abnormality is seen.  The distal spinal cord and conus are within normal limits, with conus terminating at L1.     There is no significant disc bulge, herniation, spinal canal, or foraminal stenosis from T12-L1 through L3-L4.     L4-L5: Laminectomy defect is present, with graft in the disc space.  No spinal canal or foraminal stenosis is appreciated.     L5-S1: Laminectomy defect is present, with graft in the disc space.  Grade 1 retrolisthesis is again noted.  Inflammatory changes are expected in the left paracentral zone epidural fat, surrounding the descending left S1 nerve root with enhancement after contrast injection (example series 6 and 8, image number 21).     Impression:     New posterior fusion hardware spanning L4-S1, with metallic grafts in the intervening disc spaces.     Associated laminectomy defects at L4 and L5.     L5-S1 grade 1 retrolisthesis.     L5-S1 inflammatory changes in the left paracentral zone epidural fat, surrounding the descending left S1 nerve root.      Medical History:   No past medical history on file.    Surgical History:   Eulalio Tena  has a past surgical history that includes Epidural steroid injection into lumbar spine (N/A, 10/28/2021); Epidural steroid injection (Bilateral, 10/07/2021); Minimally invasive surgical removal of intervertebral disc of spine using microscope (Right, 01/05/2022); Spine surgery (2023); and Tonsillectomy (1997).    Medications:   Eulalio has a current medication list which includes the following prescription(s): buprenorphine hcl, cyclobenzaprine, diazepam, dicyclomine, finasteride, gabapentin, oxycodone, oxycodone, oxycodone-acetaminophen, pregabalin, quviviq, silodosin, and sublocade.    Allergies:   Review of patient's allergies indicates:   Allergen Reactions    Iodine and iodide containing products          CMS Impairment/Limitation/Restriction for FOTO Survey  Therapist reviewed FOTO scores for Eulalio Tena on 6/28/2024. FOTO documents entered into EPIC  - see Media section.  FOTO filled out by: Patient  Patient's Physical FS Primary Measure: 35 (predicted 52 by visit #13)  Risk Adjusted Statistical FOTO: 51  Limitation Score: 65%  Category: Mobility  MDQ: 56% disability    Objective     Posture/Appearance    Relatively normal postural appearance    Gait Analysis: Left increased stance time, mild left trendelenburg     Palpation      Left - tender to moderate palpatory pressure piriformis, SI joint, TFL, lateral hamstrings, ischial tuberosity       Dermatomes    LLE diminished to light touch     Reflexes    Clonus: Negative bilaterally  Right: patellar: 1 and Achilles: 1  Left: patellar: 0 and Achilles: 0     Myotomes    Right - 5/5 hip, knee, ankle  Left - 4/5 hip, knee, ankle     Hip/SI Clearance    DARON: positive left for tightness in left hip; right negative  FADDIR: negative right, positive left for pinching in anterolateral hip  Hip IR ROM: WFL bilaterally  Hip ER ROM: WNL right, mildly diminished left ~25%  HIP SCOUR: negative bilaterally  LOGROLL: negative bilaterally  SACRAL DISTRACTION: negative bilaterally  SACRAL THRUST: negative  SACRAL COMPRESSION: negative left       Special Tests    Slump: negative bilaterally  SLR: negative bilaterally  Sciatic Mobility: diminished left, normal right  90/90 Hamstring: positive left, negative right  Piriformis Test: positive left, negative right       Other          TREATMENT     Eulalio received the treatments listed below:      Informed consent obtained after thorough explanation of risks and benefits. Signed consent form will be scanned into medical record. Pre-procedure time out performed which included verification of name, , and site of treatment. 70% isopropyl alcohol wipe down performed to treatment site with single use sterile prep pads.       Time Activities   Manual 25 min Intra-muscular pulsed stimulation using PHILLIP ES-130 unit and TDN (see below):    TDN done to left using Seirin 0.30 x 60mm needle and  ZakChi 0.30 needles (75mm, 100mm). 75mm needle to lateral hamstrings, 100mm needle each to piriformis, gluteus med, gluteus min, gluteus max, 60mm needle to TFL. Stim at 3Hz low frequency and 3-5 intensity for 10 min. Done in prone position.      Passive left stretch (piriformis) with sciatic glides    KT tape to left hip external rotators and TFL with 25% stretch     TherAct     TherEx 10 min HEP   Gait     Neuro Re-ed     Modalities     E-Stim     Dry Needling     Canalith Repositioning         Home Exercises and Patient Education Provided    Education provided:   -Plan of care, HEP, ddx    Written Home Exercises Provided: Yes; patient also instructed to cont prior HEP.  Exercises were reviewed and Eulalio was able to demonstrate them prior to the end of the session.  Eulalio demonstrated good  understanding of the education provided.     Copy of exercises provided placed in paper chart.    Assessment   Eulalio is a 43 y.o. male referred to outpatient Physical Therapy with a medical diagnosis of low back pain. Patient presents with symptoms consistent with sciatica 2/2 piriformis syndrome. Patient will benefit from skilled outpatient Physical Therapy to address the deficits stated above, provide education, and to maximize patient's level of independence.     Patient prognosis is Fair.     Plan of care discussed with patient: Yes  Patient's spiritual, cultural and educational needs considered and patient is agreeable to the plan of care and goals as stated below:     Anticipated Barriers for therapy: None    Goals:  Short Term Goals: 6 weeks   Patient will report at least 10% disability reduction from initial MDQ score to indicate clinically significant functional improvement  Patient will report at least 10 point increase on initial FOTO score to indicate clinically significant functional improvement    Long Term Goals: 12 weeks   Patient will report at least 15% disability reduction from initial MDQ score to indicate  clinically significant functional improvement  Patient will report at least 17 point increase on initial FOTO score to indicate clinically significant functional improvement    Plan   Plan of care Certification: 6/28/2024 to 9/28/2024.    Outpatient Physical Therapy 2-3 times weekly for 12 weeks to include the following interventions: Cervical/Lumbar Traction, Gait Training, Manual Therapy, Moist Heat/ Ice, Neuromuscular Re-ed, Patient Education, Self Care, Therapeutic Activities, and Therapeutic Exercise.     Blank Granado, PT

## 2024-06-28 ENCOUNTER — CLINICAL SUPPORT (OUTPATIENT)
Dept: REHABILITATION | Facility: HOSPITAL | Age: 44
End: 2024-06-28
Payer: MEDICARE

## 2024-06-28 DIAGNOSIS — G89.29 CHRONIC LOW BACK PAIN, UNSPECIFIED BACK PAIN LATERALITY, UNSPECIFIED WHETHER SCIATICA PRESENT: Primary | ICD-10-CM

## 2024-06-28 DIAGNOSIS — M54.50 CHRONIC LOW BACK PAIN, UNSPECIFIED BACK PAIN LATERALITY, UNSPECIFIED WHETHER SCIATICA PRESENT: Primary | ICD-10-CM

## 2024-06-28 PROCEDURE — 97110 THERAPEUTIC EXERCISES: CPT

## 2024-06-28 PROCEDURE — 97140 MANUAL THERAPY 1/> REGIONS: CPT

## 2024-06-28 PROCEDURE — 97163 PT EVAL HIGH COMPLEX 45 MIN: CPT

## 2024-07-02 NOTE — PROGRESS NOTES
Physical Therapy Treatment Note     Name: Eulalio Tena  Clinic Number: [id number]    Therapy Diagnosis:   Encounter Diagnoses   Name Primary?    Chronic low back pain, unspecified back pain laterality, unspecified whether sciatica present Yes    Impaired functional mobility, balance, gait, and endurance      Physician: Eulalio Puckett NP    Visit Date: 7/3/2024    Physician Orders: PT Eval and Treat  Medical Diagnosis from Referral: Low back pain  Evaluation Date: 2024  Authorization Period Expiration: None  Plan of Care Expiration: 2024  Visit # / Visits authorized:      Time In: 1227  Time Out: 1330  Total Billable Time: 63 minutes     Surgery: L4-S1 posterior fusion  Orthopedic Precautions: None  Pertinent History: Multiple back surgeries with most recent in 2023    Subjective     Patient reports: No significant change since evaluation. Sitting on left buttock easily elicits symptoms after ~30 minutes.    CMS Impairment/Limitation/Restriction for FOTO Survey  Therapist reviewed FOTO scores for Eulalio Tena on 2024. FOTO documents entered into [employer] - see Media section.  FOTO filled out by: Patient  Patient's Physical FS Primary Measure: 35 (predicted 52 by visit #13)  Risk Adjusted Statistical FOTO: 51  Limitation Score: 65%  Category: Mobility  MDQ: 56% disability    Objective     Eulalio received the following treatment:    Informed consent obtained after thorough explanation of risks and benefits. Signed consent form will be scanned into medical record. Pre-procedure time out performed which included verification of name, , and site of treatment. 70% isopropyl alcohol wipe down performed to treatment site with single use sterile prep pads.       Time Activities   Manual 46 min Intra-muscular pulsed stimulation using PHILLIP ES-130 unit and TDN (see below):     TDN done to left using TaiChi 0.30 needles (100mm). 100mm needle each to gluteal max and piriformis. Pistoning, twisting,  winding done to needles. Stim at 3Hz low frequency and 4 intensity for 10 min. Done in prone position.      Passive left stretch (supine hamstrings with active sciatic glides, supine piriformis IR, supine piriformis ER, prone hip ERs)    Ice massage to left buttock at TrP     KT tape to left hip (external rotators, gluteus max) with 50-75% stretch       TherAct     TherEx 17 min MHP to low back and left buttock, bridge, PT-resisted supine isometric clamshells   Gait     Neuro Re-ed           Home Exercises Provided and Patient Education Provided     Education provided:   -Plan of care, HEP, ddx    Assessment     Significant improvement noted this session regarding hamstrings/sciatic nerve flexibility on left leg; on evaluation ~40 deg SLR elicited symptoms in left buttock, however today after manual therapy able to get ~80 deg before symptoms felt in buttock (and this was with active ankle pump into max dorsiflexion). His overall symptoms do appear consistent with sciatica 2/2 piriformis syndrome. We are working early program to improve his activity tolerance and asymptomatic LLE ROM, and we will begin to increase gluteal activation exercises for hip/pelvic stabilization. I do suspect that his long-term lumbar spine pathology contributed to secondary hip/pelvic weakness and pathology. Awaiting him to bring results of his nerve conduction study.    Patient prognosis is Guarded.      Anticipated barriers to physical therapy: None    Goals: Eulalio is working towards his goals.  Short Term Goals: 6 weeks   Patient will report at least 10% disability reduction from initial MDQ score to indicate clinically significant functional improvement  Patient will report at least 10 point increase on initial FOTO score to indicate clinically significant functional improvement     Long Term Goals: 12 weeks   Patient will report at least 15% disability reduction from initial MDQ score to indicate clinically significant functional  improvement  Patient will report at least 17 point increase on initial FOTO score to indicate clinically significant functional improvement    Plan     2-3x/week x 12 weeks    Blank Granado, PT

## 2024-07-03 ENCOUNTER — CLINICAL SUPPORT (OUTPATIENT)
Dept: REHABILITATION | Facility: HOSPITAL | Age: 44
End: 2024-07-03
Payer: MEDICARE

## 2024-07-03 DIAGNOSIS — G89.29 CHRONIC LOW BACK PAIN, UNSPECIFIED BACK PAIN LATERALITY, UNSPECIFIED WHETHER SCIATICA PRESENT: Primary | ICD-10-CM

## 2024-07-03 DIAGNOSIS — Z74.09 IMPAIRED FUNCTIONAL MOBILITY, BALANCE, GAIT, AND ENDURANCE: ICD-10-CM

## 2024-07-03 DIAGNOSIS — M54.50 CHRONIC LOW BACK PAIN, UNSPECIFIED BACK PAIN LATERALITY, UNSPECIFIED WHETHER SCIATICA PRESENT: Primary | ICD-10-CM

## 2024-07-03 PROCEDURE — 97110 THERAPEUTIC EXERCISES: CPT

## 2024-07-03 PROCEDURE — 97140 MANUAL THERAPY 1/> REGIONS: CPT

## 2024-07-08 ENCOUNTER — CLINICAL SUPPORT (OUTPATIENT)
Dept: REHABILITATION | Facility: HOSPITAL | Age: 44
End: 2024-07-08
Payer: MEDICARE

## 2024-07-08 DIAGNOSIS — G89.29 CHRONIC LOW BACK PAIN, UNSPECIFIED BACK PAIN LATERALITY, UNSPECIFIED WHETHER SCIATICA PRESENT: Primary | ICD-10-CM

## 2024-07-08 DIAGNOSIS — M54.50 CHRONIC LOW BACK PAIN, UNSPECIFIED BACK PAIN LATERALITY, UNSPECIFIED WHETHER SCIATICA PRESENT: Primary | ICD-10-CM

## 2024-07-08 DIAGNOSIS — Z74.09 IMPAIRED FUNCTIONAL MOBILITY, BALANCE, GAIT, AND ENDURANCE: ICD-10-CM

## 2024-07-08 PROCEDURE — 97140 MANUAL THERAPY 1/> REGIONS: CPT

## 2024-07-08 PROCEDURE — 97112 NEUROMUSCULAR REEDUCATION: CPT

## 2024-07-08 NOTE — PROGRESS NOTES
Physical Therapy Treatment Note     Name: Eulalio Tena  Clinic Number: [id number]    Therapy Diagnosis:   No diagnosis found.    Physician: Eulalio Puckett NP    Visit Date: 7/8/2024    Physician Orders: PT Eval and Treat  Medical Diagnosis from Referral: Low back pain  Evaluation Date: 6/28/2024  Authorization Period Expiration: None  Plan of Care Expiration: 9/28/2024  Visit # / Visits authorized: 2/24     Time In: 1050  Time Out: 1146  Total Billable Time: 56 minutes     Surgery: L4-S1 posterior fusion  Orthopedic Precautions: None  Pertinent History: Multiple back surgeries with most recent in 2023 June    Subjective     Patient reports: No change in condition. Sitting on left buttock easily elicits symptoms after ~30 minutes. He is trying to get the results of his nerve conduction study.    CMS Impairment/Limitation/Restriction for FOTO Survey  Therapist reviewed FOTO scores for Eulalio Tena on 6/28/2024. FOTO documents entered into [employer] - see Media section.  FOTO filled out by: Patient  Patient's Physical FS Primary Measure: 35 (predicted 52 by visit #13)  Risk Adjusted Statistical FOTO: 51  Limitation Score: 65%  Category: Mobility  MDQ: 56% disability    Objective     Eulalio received the following treatment:       Time Activities   Manual 9 min min MTrP release to trigger point in left buttock       TherAct     TherEx  MHP to low back and left buttock, bridge, PT-resisted supine isometric clamshells   Gait     Neuro Re-ed 47 min VMS to left gluteals (quadratus, piriformis, glute max, glute med)  -10:10 cycle with intensity to tolerance  -paired with gluteal squeeze during on cycle      IFC stim to left gluteals (quadratus, piriformis, glute max, glute med)  -intensity to tolerance  -paired with MHP           Home Exercises Provided and Patient Education Provided     Education provided:   -Plan of care, HEP, ddx    Assessment     While he maintains significant improvement regarding hamstrings/sciatic  nerve flexibility on left leg; his condition and symptoms remain unchanged. His overall symptoms do appear consistent with sciatica 2/2 piriformis syndrome, but ddx may have lumbar and/or sacral etiology. We are working early program to improve his activity tolerance and asymptomatic LLE ROM, and we will begin to increase gluteal activation exercises for hip/pelvic stabilization. I do suspect that his long-term lumbar spine pathology contributed to secondary hip/pelvic weakness and pathology. Awaiting him to bring results of his nerve conduction study.    Patient prognosis is Guarded.      Anticipated barriers to physical therapy: None    Goals: Eulalio is working towards his goals.  Short Term Goals: 6 weeks   Patient will report at least 10% disability reduction from initial MDQ score to indicate clinically significant functional improvement  Patient will report at least 10 point increase on initial FOTO score to indicate clinically significant functional improvement     Long Term Goals: 12 weeks   Patient will report at least 15% disability reduction from initial MDQ score to indicate clinically significant functional improvement  Patient will report at least 17 point increase on initial FOTO score to indicate clinically significant functional improvement    Plan     2-3x/week x 12 weeks    Blank Granado, PT

## 2024-07-10 ENCOUNTER — CLINICAL SUPPORT (OUTPATIENT)
Dept: REHABILITATION | Facility: HOSPITAL | Age: 44
End: 2024-07-10
Payer: MEDICARE

## 2024-07-10 DIAGNOSIS — Z74.09 IMPAIRED FUNCTIONAL MOBILITY, BALANCE, GAIT, AND ENDURANCE: Primary | ICD-10-CM

## 2024-07-10 DIAGNOSIS — M79.18 RIGHT BUTTOCK PAIN: ICD-10-CM

## 2024-07-10 PROCEDURE — 97110 THERAPEUTIC EXERCISES: CPT

## 2024-07-10 NOTE — PROGRESS NOTES
Physical Therapy Treatment Note     Name: Eulalio Tena  Clinic Number: [id number]    Therapy Diagnosis:   Encounter Diagnoses   Name Primary?    Impaired functional mobility, balance, gait, and endurance Yes    Right buttock pain        Physician: Eulalio Puckett NP    Visit Date: 7/10/2024    Physician Orders: PT Eval and Treat  Medical Diagnosis from Referral: Low back pain  Evaluation Date: 6/28/2024  Authorization Period Expiration: None  Plan of Care Expiration: 9/28/2024  Visit # / Visits authorized: 3/24     Time In: 1115  Time Out: 1145  Total Billable Time: 30 minutes     Surgery: L4-S1 posterior fusion  Orthopedic Precautions: None  Pertinent History: Multiple back surgeries with most recent in 2023 June    Subjective     Patient reports: No change in condition. Sitting on left buttock easily elicits symptoms after ~30 minutes. He brought in results of nerve conduction study taken last year, and MRI of lumbar spine taken 6/20/24.    CMS Impairment/Limitation/Restriction for FOTO Survey  Therapist reviewed FOTO scores for Eulalio Tena on 6/28/2024. FOTO documents entered into [employer] - see Media section.  FOTO filled out by: Patient  Patient's Physical FS Primary Measure: 35 (predicted 52 by visit #13)  Risk Adjusted Statistical FOTO: 51  Limitation Score: 65%  Category: Mobility  MDQ: 56% disability    Objective     Eulalio received the following treatment:       Time Activities   Manual  MTrP release to trigger point in left buttock       TherAct     TherEx 30 min Education   Gait     Neuro Re-ed  VMS to left gluteals (quadratus, piriformis, glute max, glute med)  -10:10 cycle with intensity to tolerance  -paired with gluteal squeeze during on cycle      IFC stim to left gluteals (quadratus, piriformis, glute max, glute med)  -intensity to tolerance  -paired with MHP         MRI results from 6/23/23 left and right hip printed, given, and reviewed by this provider. NCS/EMG reviewed with patient, showed  left-sided L4-S1 radiculopathy. MRI results from 6/20/24 reviewed with patient.      Home Exercises Provided and Patient Education Provided     Education provided:   -Plan of care, NCS/EMG results, MRI results    Assessment     Imaging and testing reviewed with patient.    While he maintains significant improvement regarding hamstrings/sciatic nerve flexibility on left leg, his condition and symptoms remain unchanged. His overall symptoms do appear consistent with sciatica 2/2 piriformis syndrome, but even with manual therapy to address external rotator and gluteal group there is no symptomatic influence. Muscle activation is painful but present, so a strain/tear is low suspicion. Differential may have lumbar and/or sacral radicular etiology. I do suspect that his long-term lumbar spine pathology is contributing to his condition, however the alarming part is that we are unable to influence any sort of change in symptoms. Given this, the nature of his pain/symptoms, duration of pathology without any resolution or change, results of nerve study, and his multiple stints of PT without any change or resolution, we are referring back to medical provider for further assessment.    Patient prognosis is Guarded.      Anticipated barriers to physical therapy: None    Goals: Eulalio is working towards his goals.  Short Term Goals: 6 weeks   Patient will report at least 10% disability reduction from initial MDQ score to indicate clinically significant functional improvement  Patient will report at least 10 point increase on initial FOTO score to indicate clinically significant functional improvement     Long Term Goals: 12 weeks   Patient will report at least 15% disability reduction from initial MDQ score to indicate clinically significant functional improvement  Patient will report at least 17 point increase on initial FOTO score to indicate clinically significant functional improvement    Plan     Discharge to medical  provider    Blank Granado, PT

## 2024-07-31 ENCOUNTER — OFFICE VISIT (OUTPATIENT)
Dept: UROLOGY | Facility: CLINIC | Age: 44
End: 2024-07-31
Payer: MEDICARE

## 2024-07-31 VITALS
RESPIRATION RATE: 20 BRPM | OXYGEN SATURATION: 99 % | DIASTOLIC BLOOD PRESSURE: 82 MMHG | TEMPERATURE: 98 F | SYSTOLIC BLOOD PRESSURE: 113 MMHG | WEIGHT: 157.63 LBS | BODY MASS INDEX: 22.57 KG/M2 | HEIGHT: 70 IN | HEART RATE: 95 BPM

## 2024-07-31 DIAGNOSIS — R33.9 RETENTION OF URINE, UNSPECIFIED: ICD-10-CM

## 2024-07-31 DIAGNOSIS — N40.0 BENIGN PROSTATIC HYPERPLASIA WITHOUT LOWER URINARY TRACT SYMPTOMS: Primary | ICD-10-CM

## 2024-07-31 LAB
BILIRUB SERPL-MCNC: NEGATIVE MG/DL
BLOOD URINE, POC: NEGATIVE
COLOR, POC UA: NORMAL
GLUCOSE UR QL STRIP: NEGATIVE
KETONES UR QL STRIP: NEGATIVE
LEUKOCYTE ESTERASE URINE, POC: NEGATIVE
NITRITE, POC UA: NEGATIVE
PH, POC UA: 8
POC RESIDUAL URINE VOLUME: 7 ML (ref 0–100)
PROTEIN, POC: NEGATIVE
SPECIFIC GRAVITY, POC UA: 1
UROBILINOGEN, POC UA: 0.2

## 2024-07-31 PROCEDURE — 3008F BODY MASS INDEX DOCD: CPT | Mod: CPTII,,, | Performed by: NURSE PRACTITIONER

## 2024-07-31 PROCEDURE — 3079F DIAST BP 80-89 MM HG: CPT | Mod: CPTII,,, | Performed by: NURSE PRACTITIONER

## 2024-07-31 PROCEDURE — 99215 OFFICE O/P EST HI 40 MIN: CPT | Mod: PBBFAC,25 | Performed by: NURSE PRACTITIONER

## 2024-07-31 PROCEDURE — 99213 OFFICE O/P EST LOW 20 MIN: CPT | Mod: S$PBB,,, | Performed by: NURSE PRACTITIONER

## 2024-07-31 PROCEDURE — 3074F SYST BP LT 130 MM HG: CPT | Mod: CPTII,,, | Performed by: NURSE PRACTITIONER

## 2024-07-31 PROCEDURE — 1159F MED LIST DOCD IN RCRD: CPT | Mod: CPTII,,, | Performed by: NURSE PRACTITIONER

## 2024-07-31 PROCEDURE — 81001 URINALYSIS AUTO W/SCOPE: CPT | Mod: PBBFAC | Performed by: NURSE PRACTITIONER

## 2024-07-31 PROCEDURE — 51798 US URINE CAPACITY MEASURE: CPT | Mod: PBBFAC | Performed by: NURSE PRACTITIONER

## 2024-07-31 RX ORDER — DIPHENHYDRAMINE HCL 50 MG
CAPSULE ORAL
Qty: 1 CAPSULE | Refills: 0 | Status: SHIPPED | OUTPATIENT
Start: 2024-07-31

## 2024-07-31 RX ORDER — PREDNISONE 50 MG/1
TABLET ORAL
Qty: 3 TABLET | Refills: 0 | Status: SHIPPED | OUTPATIENT
Start: 2024-07-31

## 2024-07-31 NOTE — PROGRESS NOTES
Chief Complaint: No chief complaint on file.      HPI:   Patient is a 43-year-old male here for 1 month follow-up for BPH and weak urine stream.  Patient let failed a tamsulosin trial and also on alfuzosin trial in the recent past therefore he was switched to Rapaflo 8 mg p.o. daily on last patient visit and also finasteride 5 mg p.o. daily added to his regimen.  On patient's last visit he presented with much improved symptoms of urinary frequency, urgency feelings of incomplete emptying at this time bladder scan was 35 mL.  Today patient bladder scan 7 mL.  Today patient presents with persistent feelings of incomplete emptying his bladder and also retrograde ejaculation, patient having to strain to urinate.    Allergies:  Review of patient's allergies indicates:   Allergen Reactions    Iodine and iodide containing products        Medications:  Current Outpatient Medications   Medication Sig Dispense Refill    buprenorphine HCL (SUBUTEX) 8 mg Subl Place 8 mg under the tongue 2 (two) times daily.      cyclobenzaprine (FLEXERIL) 10 MG tablet Take 10 mg by mouth every evening.      diazePAM (VALIUM) 10 MG Tab Take 10 mg by mouth 2 (two) times daily as needed.      dicyclomine (BENTYL) 10 MG capsule Take 1 capsule (10 mg total) by mouth 2 (two) times daily as needed. (Patient not taking: Reported on 8/16/2023) 12 capsule 0    finasteride (PROSCAR) 5 mg tablet Take 1 tablet (5 mg total) by mouth once daily. 90 tablet 3    gabapentin (NEURONTIN) 800 MG tablet Take 800 mg by mouth.      oxyCODONE (ROXICODONE) 15 MG Tab Take 15 mg by mouth every 6 (six) hours as needed.      oxyCODONE (ROXICODONE) 5 MG immediate release tablet Take 5 mg by mouth every 4 (four) hours as needed. for pain.      oxyCODONE-acetaminophen (PERCOCET)  mg per tablet Take 1 tablet by mouth every 6 (six) hours as needed.      pregabalin (LYRICA) 150 MG capsule Take 150 mg by mouth 2 (two) times daily.      QUVIVIQ 25 mg Tab Take 1 tablet by  mouth.      silodosin (RAPAFLO) 8 mg Cap capsule Take 1 capsule (8 mg total) by mouth once daily. 90 capsule 3    SUBLOCADE 300 mg/1.5 mL injection Inject into the skin every 30 days.       No current facility-administered medications for this visit.       Review of Systems:  General: No fever, chills, fatigability, or weight loss.  Skin: No rashes, itching, or changes in color or texture of skin.  Chest: Denies PATEL, cyanosis, wheezing, cough, and sputum production.  Abdomen: Appetite fine. No weight loss. Denies diarrhea, abdominal pain, hematemesis, or blood in stool.  Musculoskeletal: No joint stiffness or swelling. Denies back pain.  : As above.  All other review of systems negative.    PMH:  No past medical history on file.    PSH:  Past Surgical History:   Procedure Laterality Date    EPIDURAL STEROID INJECTION Bilateral 10/07/2021    Procedure: Injection, Steroid, Epidural Interlaminar L4-L5;  Surgeon: Isamar Vazquez MD;  Location: [facility] MAIN OR;  Service: Anesthesiology;  Laterality: Bilateral;    EPIDURAL STEROID INJECTION INTO LUMBAR SPINE N/A 10/28/2021    Procedure: Injection-steroid-epidural-lumbar;  Surgeon: Isamar Vazquez MD;  Location: [facility] MAIN OR;  Service: Anesthesiology;  Laterality: N/A;    MINIMALLY INVASIVE SURGICAL REMOVAL OF INTERVERTEBRAL DISC OF SPINE USING MICROSCOPE Right 01/05/2022    Procedure: DISCECTOMY, SPINE, MINIMALLY INVASIVE, USING MICROSCOPE; Right L4-5; 3hrs duration; prone; C-arm; Microscope; METRx tubes; Damian flat with Chris frame;;  Surgeon: Oseas Dumont MD;  Location: [facility] MAIN OR;  Service: Neurosurgery;  Laterality: Right;    SPINE SURGERY  2023    Spinal fusion    TONSILLECTOMY  1997       FamHx:  Family History   Problem Relation Name Age of Onset    Arthritis Father Dad     Asthma Father Dad     COPD Father Dad     Stroke Father Dad        SocHx:  Social History     Socioeconomic History    Marital status:    Tobacco Use    Smoking status: Every Day      Current packs/day: 0.50     Types: Cigarettes    Smokeless tobacco: Current   Substance and Sexual Activity    Alcohol use: Never    Drug use: Yes     Types: Marijuana    Sexual activity: Yes     Partners: Female     Birth control/protection: None       Physical Exam:  There were no vitals filed for this visit.  General: A&Ox3, no apparent distress, no deformities  Neck: No masses, normal thyroid  Lungs: CTA keenan, no use of accessory muscles  Heart: RRR, no arrhythmias  Abdomen: Soft, NT, ND, no masses, no hernias, no hepatosplenomegaly  Lymphatic: Neck and groin nodes negative  Skin: The skin is warm and dry. No jaundice.  Ext: No c/c/e.    GUMale:  WESLEY exam deferred at this time      Labs:    Latest Reference Range & Units 09/14/22 14:00 08/09/23 10:12 02/28/24 12:05   Prostate Specific Antigen <=4.00 ng/mL 0.92 1.10 1.53       Patient's last WESLEY 08/16/2023  Urinalysis:        Impression:  BPH  Feelings of incomplete bladder emptying    Plan:  Instructed patient to continue Rapaflo 8 mg p.o. daily and Proscar 5 mg p.o. daily.  Instructed patient we will schedule for a CT of the abdomen pelvis with oral contrast to evaluate urologic system and schedule patient for a cystoscope with Dr. Diaz for urinary retention/feelings of incomplete bladder emptying.  Instructed patient on timed voiding every 2-3 hrs, double voiding, avoidance of bladder irritants such as alcohol, citrus foods, chocolate, caffeinated drinks, energy drinks, spicy foods, sugar, caffeine products, sodas. Instructed patient to avoid drinking fluids 1-2 hours prior to bedtime & void immediately before bedtime.   RTC we will schedule after cystoscope with Dr. Diaz.  Instructed patient if develops any abnormal urologic symptoms notify clinic to be re-evaluate treated or during after hours go to emergency room versus urgent here.                             [facility]

## 2024-07-31 NOTE — PROGRESS NOTES
Pt seen by GARRY Blanchard; CT ordered & pt given centralized scheduling information sheet; Pt instructed to return to clinic for next available cystoscopy; Discharge paperwork given w/pt verbalizing understanding

## 2024-08-16 ENCOUNTER — HOSPITAL ENCOUNTER (OUTPATIENT)
Dept: RADIOLOGY | Facility: HOSPITAL | Age: 44
Discharge: HOME | End: 2024-08-16
Attending: NURSE PRACTITIONER
Payer: MEDICARE

## 2024-08-16 DIAGNOSIS — N40.0 BENIGN PROSTATIC HYPERPLASIA WITHOUT LOWER URINARY TRACT SYMPTOMS: ICD-10-CM

## 2024-08-16 DIAGNOSIS — R33.9 RETENTION OF URINE, UNSPECIFIED: ICD-10-CM

## 2024-08-16 PROCEDURE — 25500020 PHARM REV CODE 255

## 2024-08-16 PROCEDURE — 74178 CT ABD&PLV WO CNTR FLWD CNTR: CPT | Mod: TC

## 2024-08-16 RX ADMIN — IOHEXOL 100 ML: 350 INJECTION, SOLUTION INTRAVENOUS at 10:08

## 2024-08-16 NOTE — PROGRESS NOTES
Per patient, was premedicated with prednisone starting yesterday x 2 doses and last dose today one hour before scan with Benadryl.

## 2024-11-11 ENCOUNTER — TELEPHONE (OUTPATIENT)
Dept: ORTHOPEDICS | Facility: CLINIC | Age: 44
End: 2024-11-11
Payer: MEDICARE

## 2024-11-11 NOTE — TELEPHONE ENCOUNTER
----- Message from Jac sent at 11/11/2024  2:19 PM CST -----  Good afternoon,     The pt listed above is being referred from Arsl[facility] Petersen to Dr. Marion for (Radiculopathy unspecified spinal region). I have scanned the referral and records into .     I spoke with [facility] and they advised the patient's primary insurance is in network with Dr. Marion. Please advise or contact pt to schedule appt at your earliest convenience.     Thank You,     Jac [facility]

## 2024-11-18 ENCOUNTER — TELEPHONE (OUTPATIENT)
Dept: ORTHOPEDICS | Facility: CLINIC | Age: 44
End: 2024-11-18
Payer: MEDICARE

## 2024-11-18 NOTE — TELEPHONE ENCOUNTER
----- Message from Christiano sent at 11/18/2024 12:47 PM CST -----  Type:  Sooner Apoointment Request    Caller is requesting a sooner appointment.   Name of Caller:pt   When is the first available appointment?none  Symptoms:piriformis syndrome, pt wants to see  due to an article they found on   [email or web address]    Would the patient rather a call back or a response via [facility]? Call   Best Call Back Number: [phone]  Additional Information:

## 2024-11-18 NOTE — TELEPHONE ENCOUNTER
----- Message from Christiano sent at 11/18/2024 12:47 PM CST -----  Type:  Sooner Apoointment Request    Caller is requesting a sooner appointment.   Name of Caller:pt   When is the first available appointment?none  Symptoms:piriformis syndrome, pt wants to see  due to an article they found on   [email or web address]    Would the patient rather a call back or a response via [facility]? Call   Best Call Back Number: [phone]  Additional Information:

## 2024-11-25 DIAGNOSIS — N40.0 BENIGN PROSTATIC HYPERPLASIA WITHOUT LOWER URINARY TRACT SYMPTOMS: Primary | ICD-10-CM

## 2025-03-21 DIAGNOSIS — R39.16 BENIGN PROSTATIC HYPERPLASIA (BPH) WITH STRAINING ON URINATION: ICD-10-CM

## 2025-03-21 DIAGNOSIS — N40.1 BENIGN PROSTATIC HYPERPLASIA (BPH) WITH STRAINING ON URINATION: ICD-10-CM

## 2025-03-21 RX ORDER — FINASTERIDE 5 MG/1
5 TABLET, FILM COATED ORAL
Qty: 90 TABLET | Refills: 3 | Status: SHIPPED | OUTPATIENT
Start: 2025-03-21

## 2025-03-21 RX ORDER — SILODOSIN 8 MG/1
8 CAPSULE ORAL
Qty: 90 CAPSULE | Refills: 3 | Status: SHIPPED | OUTPATIENT
Start: 2025-03-21

## 2025-05-02 ENCOUNTER — HOSPITAL ENCOUNTER (EMERGENCY)
Facility: HOSPITAL | Age: 45
Discharge: HOME | End: 2025-05-02
Attending: FAMILY MEDICINE
Payer: MEDICARE

## 2025-05-02 VITALS
TEMPERATURE: 98 F | RESPIRATION RATE: 17 BRPM | HEART RATE: 99 BPM | BODY MASS INDEX: 21 KG/M2 | HEIGHT: 71 IN | SYSTOLIC BLOOD PRESSURE: 124 MMHG | DIASTOLIC BLOOD PRESSURE: 89 MMHG | WEIGHT: 150 LBS | OXYGEN SATURATION: 99 %

## 2025-05-02 DIAGNOSIS — T14.90XA TRAUMA: ICD-10-CM

## 2025-05-02 DIAGNOSIS — S92.811A CLOSED FRACTURE OF SESAMOID BONE OF RIGHT FOOT, INITIAL ENCOUNTER: Primary | ICD-10-CM

## 2025-05-02 LAB
ALBUMIN SERPL-MCNC: 3.9 G/DL (ref 3.5–5)
ALBUMIN/GLOB SERPL: 1.2 RATIO (ref 1.1–2)
ALP SERPL-CCNC: NORMAL U/L
ALT SERPL-CCNC: 20 UNIT/L (ref 0–55)
ANION GAP SERPL CALC-SCNC: 9 MEQ/L
AST SERPL-CCNC: 15 UNIT/L (ref 11–45)
BASOPHILS # BLD AUTO: 0.02 X10(3)/MCL
BASOPHILS NFR BLD AUTO: 0.3 %
BILIRUB SERPL-MCNC: NORMAL MG/DL
BUN SERPL-MCNC: 12 MG/DL (ref 8.9–20.6)
CALCIUM SERPL-MCNC: 9.1 MG/DL (ref 8.4–10.2)
CHLORIDE SERPL-SCNC: 107 MMOL/L (ref 98–107)
CO2 SERPL-SCNC: 29 MMOL/L (ref 22–29)
CREAT SERPL-MCNC: NORMAL MG/DL
CREAT/UREA NIT SERPL: NORMAL
EOSINOPHIL # BLD AUTO: 0.24 X10(3)/MCL (ref 0–0.9)
EOSINOPHIL NFR BLD AUTO: 3.3 %
ERYTHROCYTE [DISTWIDTH] IN BLOOD BY AUTOMATED COUNT: 12.6 % (ref 11.5–17)
GLOBULIN SER-MCNC: 3.2 GM/DL (ref 2.4–3.5)
GLUCOSE SERPL-MCNC: 95 MG/DL (ref 74–100)
HCT VFR BLD AUTO: 40.1 % (ref 42–52)
HGB BLD-MCNC: 13.6 G/DL (ref 14–18)
IMM GRANULOCYTES # BLD AUTO: 0.03 X10(3)/MCL (ref 0–0.04)
IMM GRANULOCYTES NFR BLD AUTO: 0.4 %
LYMPHOCYTES # BLD AUTO: 1.64 X10(3)/MCL (ref 0.6–4.6)
LYMPHOCYTES NFR BLD AUTO: 22.6 %
MCH RBC QN AUTO: 30.4 PG (ref 27–31)
MCHC RBC AUTO-ENTMCNC: 33.9 G/DL (ref 33–36)
MCV RBC AUTO: 89.5 FL (ref 80–94)
MONOCYTES # BLD AUTO: 0.68 X10(3)/MCL (ref 0.1–1.3)
MONOCYTES NFR BLD AUTO: 9.4 %
NEUTROPHILS # BLD AUTO: 4.66 X10(3)/MCL (ref 2.1–9.2)
NEUTROPHILS NFR BLD AUTO: 64 %
NRBC BLD AUTO-RTO: 0 %
PLATELET # BLD AUTO: 206 X10(3)/MCL (ref 130–400)
PMV BLD AUTO: 9.4 FL (ref 7.4–10.4)
POTASSIUM SERPL-SCNC: 3.9 MMOL/L (ref 3.5–5.1)
PROT SERPL-MCNC: 7.1 GM/DL (ref 6.4–8.3)
RBC # BLD AUTO: 4.48 X10(6)/MCL (ref 4.7–6.1)
SODIUM SERPL-SCNC: 145 MMOL/L (ref 136–145)
WBC # BLD AUTO: 7.27 X10(3)/MCL (ref 4.5–11.5)

## 2025-05-02 PROCEDURE — 99283 EMERGENCY DEPT VISIT LOW MDM: CPT | Mod: 25

## 2025-05-02 PROCEDURE — 85025 COMPLETE CBC W/AUTO DIFF WBC: CPT | Performed by: FAMILY MEDICINE

## 2025-05-02 PROCEDURE — 80053 COMPREHEN METABOLIC PANEL: CPT | Performed by: FAMILY MEDICINE

## 2025-05-02 RX ORDER — SULFAMETHOXAZOLE AND TRIMETHOPRIM 800; 160 MG/1; MG/1
1 TABLET ORAL 2 TIMES DAILY
Qty: 20 TABLET | Refills: 0 | Status: SHIPPED | OUTPATIENT
Start: 2025-05-02 | End: 2025-05-12

## 2025-05-02 NOTE — ED PROVIDER NOTES
Encounter Date: 5/2/2025       History     Chief Complaint   Patient presents with    Foot Injury     Right foot and great toe injury, states hit a step yesterday am.  Red swollen area noted to right great toe.       A 45-year-old male who comes in today with right foot pain mostly in the area of the base of the right great toe.  Patient states that yesterday he got out of his car and went to walk up some concrete steps and he was wearing flip-flops and his big toe jammed into 1 of the concrete steps.  He states that it is swollen up immediately and he could not walk on it so he went to his doctor who did not x-ray and called him later on and told him that all he had was an old fracture.  He states that overnight he became bruised and very very painful and he can barely walk on it today.    The history is provided by the patient.     Review of patient's allergies indicates:   Allergen Reactions    Iodine and iodide containing products      History reviewed. No pertinent past medical history.  Past Surgical History:   Procedure Laterality Date    EPIDURAL STEROID INJECTION Bilateral 10/07/2021    Procedure: Injection, Steroid, Epidural Interlaminar L4-L5;  Surgeon: Isamar Vazquez MD;  Location: [facility] MAIN OR;  Service: Anesthesiology;  Laterality: Bilateral;    EPIDURAL STEROID INJECTION INTO LUMBAR SPINE N/A 10/28/2021    Procedure: Injection-steroid-epidural-lumbar;  Surgeon: Isamar Vazquez MD;  Location: [facility] MAIN OR;  Service: Anesthesiology;  Laterality: N/A;    MINIMALLY INVASIVE SURGICAL REMOVAL OF INTERVERTEBRAL DISC OF SPINE USING MICROSCOPE Right 01/05/2022    Procedure: DISCECTOMY, SPINE, MINIMALLY INVASIVE, USING MICROSCOPE; Right L4-5; 3hrs duration; prone; C-arm; Microscope; METRx tubes; Damian flat with Chris frame;;  Surgeon: Oseas Dumont MD;  Location: \[facility]\"" MAIN OR;  Service: Neurosurgery;  Laterality: Right;    SPINE SURGERY  2023    Spinal fusion    TONSILLECTOMY  1997     Family History    Problem Relation Name Age of Onset    Arthritis Father Dad     Asthma Father Dad     COPD Father Dad     Stroke Father Dad      Social History[1]  Review of Systems   Constitutional: Negative.    HENT: Negative.     Respiratory: Negative.     Cardiovascular: Negative.    Endocrine: Negative.    Musculoskeletal:         Right foot pain and swelling and ecchymosis   Neurological: Negative.    Psychiatric/Behavioral: Negative.     All other systems reviewed and are negative.      Physical Exam     Initial Vitals [05/02/25 0816]   BP Pulse Resp Temp SpO2   124/89 104 18 98.2 °F (36.8 °C) 99 %      MAP       --         Physical Exam    Nursing note and vitals reviewed.  Constitutional: He appears well-developed and well-nourished.   HENT:   Head: Normocephalic.   Eyes: Pupils are equal, round, and reactive to light.   Neck:   Normal range of motion.  Cardiovascular:  Normal rate and regular rhythm.           Pulmonary/Chest: Breath sounds normal.   Abdominal: Abdomen is soft. Bowel sounds are normal.   Musculoskeletal:         General: Normal range of motion.      Cervical back: Normal range of motion.        Feet:       Comments: Patient has pain in the area of the right great toe but on the bottom part of his foot, it hurts all the way down to the heel.  There is inflammation the area is erythematous around his toe and he has got some contusion as well.  There is a question of maybe possible infection the way that the toe looked so I will be putting him on antibiotics     Neurological: He is alert and oriented to person, place, and time.   Skin: Skin is warm and dry.   Psychiatric: He has a normal mood and affect.         ED Course   Procedures  Labs Reviewed   CBC WITH DIFFERENTIAL - Abnormal       Result Value    WBC 7.27      RBC 4.48 (*)     Hgb 13.6 (*)     Hct 40.1 (*)     MCV 89.5      MCH 30.4      MCHC 33.9      RDW 12.6      Platelet 206      MPV 9.4      Neut % 64.0      Lymph % 22.6      Mono % 9.4       Eos % 3.3      Basophil % 0.3      Imm Grans % 0.4      Neut # 4.66      Lymph # 1.64      Mono # 0.68      Eos # 0.24      Baso # 0.02      Imm Gran # 0.03      NRBC% 0.0     CBC W/ AUTO DIFFERENTIAL    Narrative:     The following orders were created for panel order CBC auto differential.  Procedure                               Abnormality         Status                     ---------                               -----------         ------                     CBC with Differential[[id number]]       Abnormal            Final result                 Please view results for these tests on the individual orders.   COMPREHENSIVE METABOLIC PANEL    Sodium 145      Potassium 3.9      Chloride 107      CO2 29      Glucose 95      Blood Urea Nitrogen 12.0      Creatinine        Calcium 9.1      Protein Total 7.1      Albumin 3.9      Globulin 3.2      Albumin/Globulin Ratio 1.2      Bilirubin Total        ALP        ALT 20      AST 15      Anion Gap 9.0      BUN/Creatinine Ratio              Imaging Results              X-Ray Foot Complete Right (In process)                      Medications - No data to display  Medical Decision Making  This patient is a 45-year-old male who comes in with right foot pain after jamming his great toe into a concrete step yesterday.  Differential diagnosis; right great toe fracture, right foot fracture    Amount and/or Complexity of Data Reviewed  Labs: ordered.     Details: Labs were done on this patient because the toe looks erythematous and he has got a normal CMP, CBC with a normal white blood cell count of 7.27, hemoglobin is 13.6 and hematocrit is 40.1  Radiology: ordered.     Details: X-ray of the foot shows the patient has a fracture of sesamoid bone.  He also has an old looking fracture on the distal phalanx of the right great toe                                      Clinical Impression:  Final diagnoses:  [T14.90XA] Trauma  [S92.811A] Closed fracture of sesamoid bone of right foot,  initial encounter (Primary)          ED Disposition Condition    Discharge Stable          ED Prescriptions    None       Follow-up Information       Follow up With Specialties Details Why Contact Info    Eulalio Puckett NP Family Medicine In 2 days  [address] [place] [address]  [phone]                 [1]   Social History  Tobacco Use    Smoking status: Every Day     Current packs/day: 0.50     Types: Cigarettes    Smokeless tobacco: Current     Types: Snuff   Substance Use Topics    Alcohol use: Never    Drug use: Yes     Types: Marijuana        Kaylin Najera MD  05/02/25 0926

## 2025-05-02 NOTE — DISCHARGE INSTRUCTIONS
Rest  Increase fluid intake  Follow up with PCP in 3-4 days   Look for foot sleeve for sesamoiditis.  This will help with pain

## 2025-07-25 ENCOUNTER — LAB VISIT (OUTPATIENT)
Dept: LAB | Facility: HOSPITAL | Age: 45
End: 2025-07-25
Attending: NURSE PRACTITIONER
Payer: MEDICARE

## 2025-07-25 DIAGNOSIS — N40.0 BENIGN PROSTATIC HYPERPLASIA WITHOUT LOWER URINARY TRACT SYMPTOMS: ICD-10-CM

## 2025-07-25 LAB — PSA SERPL-MCNC: 0.68 NG/ML

## 2025-07-25 PROCEDURE — 84153 ASSAY OF PSA TOTAL: CPT

## 2025-07-25 PROCEDURE — 36415 COLL VENOUS BLD VENIPUNCTURE: CPT

## 2025-08-12 ENCOUNTER — OFFICE VISIT (OUTPATIENT)
Dept: UROLOGY | Facility: CLINIC | Age: 45
End: 2025-08-12
Payer: MEDICARE

## 2025-08-12 VITALS
HEIGHT: 71 IN | BODY MASS INDEX: 20.58 KG/M2 | HEART RATE: 86 BPM | DIASTOLIC BLOOD PRESSURE: 82 MMHG | SYSTOLIC BLOOD PRESSURE: 121 MMHG | OXYGEN SATURATION: 100 % | WEIGHT: 147 LBS | RESPIRATION RATE: 20 BRPM | TEMPERATURE: 98 F

## 2025-08-12 DIAGNOSIS — R39.16 BENIGN PROSTATIC HYPERPLASIA (BPH) WITH STRAINING ON URINATION: ICD-10-CM

## 2025-08-12 DIAGNOSIS — R33.9 RETENTION OF URINE, UNSPECIFIED: Primary | ICD-10-CM

## 2025-08-12 DIAGNOSIS — N40.1 BENIGN PROSTATIC HYPERPLASIA (BPH) WITH STRAINING ON URINATION: ICD-10-CM

## 2025-08-12 PROCEDURE — 1159F MED LIST DOCD IN RCRD: CPT | Mod: CPTII,,, | Performed by: NURSE PRACTITIONER

## 2025-08-12 PROCEDURE — 1160F RVW MEDS BY RX/DR IN RCRD: CPT | Mod: CPTII,,, | Performed by: NURSE PRACTITIONER

## 2025-08-12 PROCEDURE — 3008F BODY MASS INDEX DOCD: CPT | Mod: CPTII,,, | Performed by: NURSE PRACTITIONER

## 2025-08-12 PROCEDURE — 99213 OFFICE O/P EST LOW 20 MIN: CPT | Mod: S$PBB,,, | Performed by: NURSE PRACTITIONER

## 2025-08-12 PROCEDURE — 3074F SYST BP LT 130 MM HG: CPT | Mod: CPTII,,, | Performed by: NURSE PRACTITIONER

## 2025-08-12 PROCEDURE — 99214 OFFICE O/P EST MOD 30 MIN: CPT | Mod: PBBFAC | Performed by: NURSE PRACTITIONER

## 2025-08-12 PROCEDURE — 3079F DIAST BP 80-89 MM HG: CPT | Mod: CPTII,,, | Performed by: NURSE PRACTITIONER

## 2025-08-12 RX ORDER — OXYCODONE HYDROCHLORIDE 15 MG/1
15 TABLET ORAL EVERY 8 HOURS PRN
COMMUNITY
Start: 2025-07-18

## 2025-08-12 RX ORDER — FINASTERIDE 5 MG/1
5 TABLET, FILM COATED ORAL DAILY
Qty: 90 TABLET | Refills: 3 | Status: SHIPPED | OUTPATIENT
Start: 2025-08-12

## 2025-08-12 RX ORDER — AMOXICILLIN AND CLAVULANATE POTASSIUM 875; 125 MG/1; MG/1
1 TABLET, FILM COATED ORAL EVERY 12 HOURS
COMMUNITY
Start: 2025-08-08

## 2025-08-12 RX ORDER — PREDNISONE 20 MG/1
20 TABLET ORAL 2 TIMES DAILY
COMMUNITY

## 2025-08-12 RX ORDER — SILODOSIN 8 MG/1
8 CAPSULE ORAL DAILY
Qty: 90 CAPSULE | Refills: 3 | Status: SHIPPED | OUTPATIENT
Start: 2025-08-12